# Patient Record
Sex: FEMALE | Race: BLACK OR AFRICAN AMERICAN | NOT HISPANIC OR LATINO | Employment: PART TIME | URBAN - METROPOLITAN AREA
[De-identification: names, ages, dates, MRNs, and addresses within clinical notes are randomized per-mention and may not be internally consistent; named-entity substitution may affect disease eponyms.]

---

## 2017-02-16 PROBLEM — C50.411 MALIGNANT NEOPLASM OF UPPER-OUTER QUADRANT OF RIGHT FEMALE BREAST: Status: ACTIVE | Noted: 2017-02-16

## 2017-04-25 PROBLEM — C50.411 MALIGNANT NEOPLASM OF UPPER-OUTER QUADRANT OF RIGHT FEMALE BREAST: Status: RESOLVED | Noted: 2017-02-16 | Resolved: 2017-04-25

## 2017-04-25 PROBLEM — E66.9 NON MORBID OBESITY: Status: ACTIVE | Noted: 2017-04-25

## 2017-07-17 PROBLEM — T45.1X5A NEUROPATHY DUE TO CHEMOTHERAPEUTIC DRUG: Status: ACTIVE | Noted: 2017-07-17

## 2017-07-17 PROBLEM — G57.93 NEUROPATHIC PAIN OF BOTH LEGS: Status: ACTIVE | Noted: 2017-07-17

## 2017-07-17 PROBLEM — G62.0 NEUROPATHY DUE TO CHEMOTHERAPEUTIC DRUG: Status: ACTIVE | Noted: 2017-07-17

## 2017-08-21 PROBLEM — C50.911: Status: ACTIVE | Noted: 2017-01-01

## 2018-01-01 ENCOUNTER — TELEPHONE (OUTPATIENT)
Dept: HEMATOLOGY/ONCOLOGY | Facility: CLINIC | Age: 43
End: 2018-01-01

## 2018-01-01 ENCOUNTER — INITIAL CONSULT (OUTPATIENT)
Dept: HEMATOLOGY/ONCOLOGY | Facility: CLINIC | Age: 43
End: 2018-01-01
Payer: COMMERCIAL

## 2018-01-01 ENCOUNTER — OFFICE VISIT (OUTPATIENT)
Dept: WOUND CARE | Facility: CLINIC | Age: 43
End: 2018-01-01
Payer: COMMERCIAL

## 2018-01-01 ENCOUNTER — SURGERY (OUTPATIENT)
Age: 43
End: 2018-01-01

## 2018-01-01 ENCOUNTER — TELEPHONE (OUTPATIENT)
Dept: INFUSION THERAPY | Facility: HOSPITAL | Age: 43
End: 2018-01-01

## 2018-01-01 ENCOUNTER — TELEPHONE (OUTPATIENT)
Dept: PAIN MEDICINE | Facility: CLINIC | Age: 43
End: 2018-01-01

## 2018-01-01 ENCOUNTER — HOSPITAL ENCOUNTER (OUTPATIENT)
Facility: HOSPITAL | Age: 43
LOS: 1 days | Discharge: HOME | End: 2018-06-22
Attending: SURGERY | Admitting: SURGERY
Payer: COMMERCIAL

## 2018-01-01 ENCOUNTER — TELEPHONE (OUTPATIENT)
Dept: SURGERY | Facility: CLINIC | Age: 43
End: 2018-01-01

## 2018-01-01 ENCOUNTER — OFFICE VISIT (OUTPATIENT)
Dept: HEMATOLOGY/ONCOLOGY | Facility: CLINIC | Age: 43
End: 2018-01-01
Payer: COMMERCIAL

## 2018-01-01 ENCOUNTER — OFFICE VISIT (OUTPATIENT)
Dept: PSYCHIATRY | Facility: CLINIC | Age: 43
End: 2018-01-01
Payer: COMMERCIAL

## 2018-01-01 ENCOUNTER — ANESTHESIA EVENT (OUTPATIENT)
Dept: SURGERY | Facility: HOSPITAL | Age: 43
End: 2018-01-01
Payer: COMMERCIAL

## 2018-01-01 ENCOUNTER — OFFICE VISIT (OUTPATIENT)
Dept: SURGERY | Facility: CLINIC | Age: 43
End: 2018-01-01
Payer: COMMERCIAL

## 2018-01-01 ENCOUNTER — ANESTHESIA (OUTPATIENT)
Dept: SURGERY | Facility: HOSPITAL | Age: 43
End: 2018-01-01
Payer: COMMERCIAL

## 2018-01-01 ENCOUNTER — LAB VISIT (OUTPATIENT)
Dept: LAB | Facility: HOSPITAL | Age: 43
End: 2018-01-01
Payer: COMMERCIAL

## 2018-01-01 ENCOUNTER — RESEARCH ENCOUNTER (OUTPATIENT)
Dept: RESEARCH | Facility: HOSPITAL | Age: 43
End: 2018-01-01

## 2018-01-01 ENCOUNTER — HOSPITAL ENCOUNTER (OUTPATIENT)
Dept: RADIOLOGY | Facility: HOSPITAL | Age: 43
Discharge: HOME | End: 2018-06-14
Attending: INTERNAL MEDICINE
Payer: COMMERCIAL

## 2018-01-01 VITALS
TEMPERATURE: 98 F | SYSTOLIC BLOOD PRESSURE: 110 MMHG | DIASTOLIC BLOOD PRESSURE: 73 MMHG | WEIGHT: 199 LBS | HEART RATE: 104 BPM | BODY MASS INDEX: 33.97 KG/M2 | HEIGHT: 64 IN

## 2018-01-01 VITALS
RESPIRATION RATE: 18 BRPM | TEMPERATURE: 100 F | HEART RATE: 126 BPM | HEIGHT: 64 IN | SYSTOLIC BLOOD PRESSURE: 105 MMHG | DIASTOLIC BLOOD PRESSURE: 70 MMHG | OXYGEN SATURATION: 94 %

## 2018-01-01 VITALS
TEMPERATURE: 98 F | HEART RATE: 81 BPM | DIASTOLIC BLOOD PRESSURE: 82 MMHG | HEIGHT: 64 IN | WEIGHT: 206.81 LBS | RESPIRATION RATE: 16 BRPM | BODY MASS INDEX: 35.31 KG/M2 | SYSTOLIC BLOOD PRESSURE: 132 MMHG

## 2018-01-01 VITALS
OXYGEN SATURATION: 97 % | TEMPERATURE: 98 F | BODY MASS INDEX: 33.15 KG/M2 | WEIGHT: 199 LBS | DIASTOLIC BLOOD PRESSURE: 89 MMHG | HEART RATE: 89 BPM | SYSTOLIC BLOOD PRESSURE: 138 MMHG | RESPIRATION RATE: 18 BRPM | HEIGHT: 65 IN

## 2018-01-01 VITALS
TEMPERATURE: 99 F | HEIGHT: 64 IN | SYSTOLIC BLOOD PRESSURE: 111 MMHG | DIASTOLIC BLOOD PRESSURE: 71 MMHG | HEART RATE: 117 BPM

## 2018-01-01 VITALS
DIASTOLIC BLOOD PRESSURE: 72 MMHG | WEIGHT: 209.69 LBS | SYSTOLIC BLOOD PRESSURE: 104 MMHG | BODY MASS INDEX: 35.8 KG/M2 | HEART RATE: 72 BPM | TEMPERATURE: 98 F | HEIGHT: 64 IN | RESPIRATION RATE: 18 BRPM

## 2018-01-01 VITALS
TEMPERATURE: 97 F | WEIGHT: 202.81 LBS | SYSTOLIC BLOOD PRESSURE: 112 MMHG | HEIGHT: 64 IN | HEART RATE: 98 BPM | BODY MASS INDEX: 34.62 KG/M2 | DIASTOLIC BLOOD PRESSURE: 81 MMHG

## 2018-01-01 DIAGNOSIS — R22.31 AXILLARY MASS, RIGHT: ICD-10-CM

## 2018-01-01 DIAGNOSIS — I89.0 LYMPHEDEMA OF RIGHT UPPER EXTREMITY: ICD-10-CM

## 2018-01-01 DIAGNOSIS — C50.411 MALIGNANT NEOPLASM OF UPPER-OUTER QUADRANT OF RIGHT BREAST IN FEMALE, ESTROGEN RECEPTOR NEGATIVE: ICD-10-CM

## 2018-01-01 DIAGNOSIS — C50.411 MALIGNANT NEOPLASM OF UPPER-OUTER QUADRANT OF RIGHT BREAST IN FEMALE, ESTROGEN RECEPTOR NEGATIVE: Primary | ICD-10-CM

## 2018-01-01 DIAGNOSIS — Z17.1 MALIGNANT NEOPLASM OF UPPER-OUTER QUADRANT OF RIGHT BREAST IN FEMALE, ESTROGEN RECEPTOR NEGATIVE: ICD-10-CM

## 2018-01-01 DIAGNOSIS — Z17.1 MALIGNANT NEOPLASM OF UPPER-OUTER QUADRANT OF RIGHT BREAST IN FEMALE, ESTROGEN RECEPTOR NEGATIVE: Primary | ICD-10-CM

## 2018-01-01 DIAGNOSIS — Z00.6 EXAMINATION OF PARTICIPANT IN CLINICAL TRIAL: ICD-10-CM

## 2018-01-01 DIAGNOSIS — C50.911 LOCAL RECURRENCE OF MALIGNANT TUMOR OF RIGHT BREAST: ICD-10-CM

## 2018-01-01 DIAGNOSIS — C50.411 MALIGNANT NEOPLASM OF UPPER-OUTER QUADRANT OF RIGHT FEMALE BREAST, UNSPECIFIED ESTROGEN RECEPTOR STATUS: Primary | ICD-10-CM

## 2018-01-01 DIAGNOSIS — R11.2 CHEMOTHERAPY INDUCED NAUSEA AND VOMITING: Primary | ICD-10-CM

## 2018-01-01 DIAGNOSIS — C50.011 MALIGNANT NEOPLASM OF NIPPLE OF RIGHT BREAST IN FEMALE, UNSPECIFIED ESTROGEN RECEPTOR STATUS: ICD-10-CM

## 2018-01-01 DIAGNOSIS — G89.3 CANCER RELATED PAIN: Primary | ICD-10-CM

## 2018-01-01 DIAGNOSIS — F43.23 ADJUSTMENT DISORDER WITH MIXED ANXIETY AND DEPRESSED MOOD: Primary | ICD-10-CM

## 2018-01-01 DIAGNOSIS — C50.911 BREAST CANCER METASTASIZED TO AXILLARY LYMPH NODE, RIGHT: Primary | ICD-10-CM

## 2018-01-01 DIAGNOSIS — T45.1X5A CHEMOTHERAPY INDUCED NAUSEA AND VOMITING: Primary | ICD-10-CM

## 2018-01-01 DIAGNOSIS — I82.621 ACUTE DEEP VEIN THROMBOSIS (DVT) OF RIGHT UPPER EXTREMITY, UNSPECIFIED VEIN: ICD-10-CM

## 2018-01-01 DIAGNOSIS — G89.3 CANCER ASSOCIATED PAIN: ICD-10-CM

## 2018-01-01 DIAGNOSIS — R59.0 LYMPHADENOPATHY, AXILLARY: Primary | ICD-10-CM

## 2018-01-01 DIAGNOSIS — S21.001A OPEN WOUND OF RIGHT FEMALE BREAST, INITIAL ENCOUNTER: ICD-10-CM

## 2018-01-01 DIAGNOSIS — I89.0 LYMPHEDEMA: Primary | ICD-10-CM

## 2018-01-01 DIAGNOSIS — R59.0 LYMPHADENOPATHY, AXILLARY: ICD-10-CM

## 2018-01-01 DIAGNOSIS — C77.3 BREAST CANCER METASTASIZED TO AXILLARY LYMPH NODE, RIGHT: Primary | ICD-10-CM

## 2018-01-01 DIAGNOSIS — C50.411 MALIGNANT NEOPLASM OF UPPER-OUTER QUADRANT OF RIGHT FEMALE BREAST, UNSPECIFIED ESTROGEN RECEPTOR STATUS: ICD-10-CM

## 2018-01-01 LAB
ALBUMIN SERPL BCP-MCNC: 2.7 G/DL
ALP SERPL-CCNC: 57 U/L
ALT SERPL W/O P-5'-P-CCNC: 20 U/L
ANION GAP SERPL CALC-SCNC: 9 MMOL/L
AST SERPL-CCNC: 32 U/L
BILIRUB SERPL-MCNC: 0.4 MG/DL
BUN SERPL-MCNC: 11 MG/DL
CALCIUM SERPL-MCNC: 10 MG/DL
CHLORIDE SERPL-SCNC: 105 MMOL/L
CO2 SERPL-SCNC: 25 MMOL/L
CREAT SERPL-MCNC: 0.8 MG/DL
ERYTHROCYTE [DISTWIDTH] IN BLOOD BY AUTOMATED COUNT: 13.2 %
EST. GFR  (AFRICAN AMERICAN): >60 ML/MIN/1.73 M^2
EST. GFR  (NON AFRICAN AMERICAN): >60 ML/MIN/1.73 M^2
GLUCOSE SERPL-MCNC: 90 MG/DL
HCT VFR BLD AUTO: 35.9 %
HGB BLD-MCNC: 11 G/DL
IMM GRANULOCYTES # BLD AUTO: 0.02 K/UL
MCH RBC QN AUTO: 28.4 PG
MCHC RBC AUTO-ENTMCNC: 30.6 G/DL
MCV RBC AUTO: 93 FL
NEUTROPHILS # BLD AUTO: 2.4 K/UL
PLATELET # BLD AUTO: 490 K/UL
PMV BLD AUTO: 9.1 FL
POTASSIUM SERPL-SCNC: 3.8 MMOL/L
PROT SERPL-MCNC: 7.1 G/DL
RBC # BLD AUTO: 3.87 M/UL
SODIUM SERPL-SCNC: 139 MMOL/L
WBC # BLD AUTO: 3.7 K/UL

## 2018-01-01 PROCEDURE — 63600175 PHARM REV CODE 636 W HCPCS: Performed by: ANESTHESIOLOGY

## 2018-01-01 PROCEDURE — 99999 PR PBB SHADOW E&M-EST. PATIENT-LVL II: CPT | Mod: PBBFAC,,, | Performed by: PSYCHOLOGIST

## 2018-01-01 PROCEDURE — 99244 OFF/OP CNSLTJ NEW/EST MOD 40: CPT | Mod: S$GLB,,, | Performed by: SURGERY

## 2018-01-01 PROCEDURE — 64461 PVB THORACIC SINGLE INJ SITE: CPT | Mod: 59,RT,, | Performed by: ANESTHESIOLOGY

## 2018-01-01 PROCEDURE — 99215 OFFICE O/P EST HI 40 MIN: CPT | Mod: 25,S$GLB,, | Performed by: INTERNAL MEDICINE

## 2018-01-01 PROCEDURE — 37000008 HC ANESTHESIA 1ST 15 MINUTES: Performed by: SURGERY

## 2018-01-01 PROCEDURE — 71000039 HC RECOVERY, EACH ADD'L HOUR: Performed by: SURGERY

## 2018-01-01 PROCEDURE — 11100 PR BIOPSY OF SKIN LESION: CPT | Mod: ,,, | Performed by: SURGERY

## 2018-01-01 PROCEDURE — 71000033 HC RECOVERY, INTIAL HOUR: Performed by: SURGERY

## 2018-01-01 PROCEDURE — 63600175 PHARM REV CODE 636 W HCPCS: Performed by: STUDENT IN AN ORGANIZED HEALTH CARE EDUCATION/TRAINING PROGRAM

## 2018-01-01 PROCEDURE — 37000009 HC ANESTHESIA EA ADD 15 MINS: Performed by: SURGERY

## 2018-01-01 PROCEDURE — 99024 POSTOP FOLLOW-UP VISIT: CPT | Mod: S$GLB,,, | Performed by: SURGERY

## 2018-01-01 PROCEDURE — 25000003 PHARM REV CODE 250: Performed by: STUDENT IN AN ORGANIZED HEALTH CARE EDUCATION/TRAINING PROGRAM

## 2018-01-01 PROCEDURE — 80053 COMPREHEN METABOLIC PANEL: CPT

## 2018-01-01 PROCEDURE — 25000003 PHARM REV CODE 250: Performed by: NURSE ANESTHETIST, CERTIFIED REGISTERED

## 2018-01-01 PROCEDURE — 96372 THER/PROPH/DIAG INJ SC/IM: CPT | Mod: S$GLB,,, | Performed by: INTERNAL MEDICINE

## 2018-01-01 PROCEDURE — 27000221 HC OXYGEN, UP TO 24 HOURS

## 2018-01-01 PROCEDURE — 99999 PR PBB SHADOW E&M-EST. PATIENT-LVL IV: CPT | Mod: PBBFAC,,, | Performed by: NURSE PRACTITIONER

## 2018-01-01 PROCEDURE — D9220A PRA ANESTHESIA: Mod: CRNA,,, | Performed by: NURSE ANESTHETIST, CERTIFIED REGISTERED

## 2018-01-01 PROCEDURE — 63600175 PHARM REV CODE 636 W HCPCS: Performed by: NURSE ANESTHETIST, CERTIFIED REGISTERED

## 2018-01-01 PROCEDURE — 99999 PR PBB SHADOW E&M-EST. PATIENT-LVL V: CPT | Mod: PBBFAC,,, | Performed by: INTERNAL MEDICINE

## 2018-01-01 PROCEDURE — 99999 PR PBB SHADOW E&M-EST. PATIENT-LVL III: CPT | Mod: PBBFAC,,, | Performed by: INTERNAL MEDICINE

## 2018-01-01 PROCEDURE — 71000015 HC POSTOP RECOV 1ST HR: Performed by: SURGERY

## 2018-01-01 PROCEDURE — 99999 PR PBB SHADOW E&M-EST. PATIENT-LVL III: CPT | Mod: PBBFAC,,, | Performed by: SURGERY

## 2018-01-01 PROCEDURE — 3008F BODY MASS INDEX DOCD: CPT | Mod: CPTII,S$GLB,, | Performed by: NURSE PRACTITIONER

## 2018-01-01 PROCEDURE — 36000706: Performed by: SURGERY

## 2018-01-01 PROCEDURE — 90791 PSYCH DIAGNOSTIC EVALUATION: CPT | Mod: S$GLB,,, | Performed by: PSYCHOLOGIST

## 2018-01-01 PROCEDURE — 99215 OFFICE O/P EST HI 40 MIN: CPT | Mod: S$GLB,,, | Performed by: INTERNAL MEDICINE

## 2018-01-01 PROCEDURE — 64520 N BLOCK LUMBAR/THORACIC: CPT | Performed by: ANESTHESIOLOGY

## 2018-01-01 PROCEDURE — 3008F BODY MASS INDEX DOCD: CPT | Mod: CPTII,S$GLB,, | Performed by: INTERNAL MEDICINE

## 2018-01-01 PROCEDURE — S0028 INJECTION, FAMOTIDINE, 20 MG: HCPCS | Performed by: NURSE ANESTHETIST, CERTIFIED REGISTERED

## 2018-01-01 PROCEDURE — 85027 COMPLETE CBC AUTOMATED: CPT

## 2018-01-01 PROCEDURE — 99205 OFFICE O/P NEW HI 60 MIN: CPT | Mod: S$GLB,,, | Performed by: INTERNAL MEDICINE

## 2018-01-01 PROCEDURE — 36000707: Performed by: SURGERY

## 2018-01-01 PROCEDURE — 99203 OFFICE O/P NEW LOW 30 MIN: CPT | Mod: S$GLB,,, | Performed by: NURSE PRACTITIONER

## 2018-01-01 PROCEDURE — D9220A PRA ANESTHESIA: Mod: ANES,,, | Performed by: ANESTHESIOLOGY

## 2018-01-01 PROCEDURE — 36415 COLL VENOUS BLD VENIPUNCTURE: CPT

## 2018-01-01 PROCEDURE — 63600175 PHARM REV CODE 636 W HCPCS

## 2018-01-01 PROCEDURE — 90832 PSYTX W PT 30 MINUTES: CPT | Mod: S$GLB,,, | Performed by: PSYCHOLOGIST

## 2018-01-01 PROCEDURE — 27201423 OPTIME MED/SURG SUP & DEVICES STERILE SUPPLY: Performed by: SURGERY

## 2018-01-01 RX ORDER — PROPOFOL 10 MG/ML
VIAL (ML) INTRAVENOUS
Status: DISCONTINUED | OUTPATIENT
Start: 2018-01-01 | End: 2018-01-01

## 2018-01-01 RX ORDER — HYDROMORPHONE HYDROCHLORIDE 2 MG/ML
1 INJECTION, SOLUTION INTRAMUSCULAR; INTRAVENOUS; SUBCUTANEOUS ONCE
Status: DISCONTINUED | OUTPATIENT
Start: 2018-01-01 | End: 2018-01-01

## 2018-01-01 RX ORDER — HYDROMORPHONE HYDROCHLORIDE 2 MG/ML
1 INJECTION, SOLUTION INTRAMUSCULAR; INTRAVENOUS; SUBCUTANEOUS ONCE
Status: COMPLETED | OUTPATIENT
Start: 2018-01-01 | End: 2018-01-01

## 2018-01-01 RX ORDER — MIDAZOLAM HYDROCHLORIDE 1 MG/ML
2 INJECTION INTRAMUSCULAR; INTRAVENOUS ONCE
Status: COMPLETED | OUTPATIENT
Start: 2018-01-01 | End: 2018-01-01

## 2018-01-01 RX ORDER — MIDAZOLAM HYDROCHLORIDE 1 MG/ML
INJECTION, SOLUTION INTRAMUSCULAR; INTRAVENOUS
Status: DISCONTINUED | OUTPATIENT
Start: 2018-01-01 | End: 2018-01-01

## 2018-01-01 RX ORDER — HYDROMORPHONE HYDROCHLORIDE 1 MG/ML
0.2 INJECTION, SOLUTION INTRAMUSCULAR; INTRAVENOUS; SUBCUTANEOUS EVERY 5 MIN PRN
Status: COMPLETED | OUTPATIENT
Start: 2018-01-01 | End: 2018-01-01

## 2018-01-01 RX ORDER — SUCCINYLCHOLINE CHLORIDE 20 MG/ML
INJECTION INTRAMUSCULAR; INTRAVENOUS
Status: DISCONTINUED | OUTPATIENT
Start: 2018-01-01 | End: 2018-01-01

## 2018-01-01 RX ORDER — FAMOTIDINE 10 MG/ML
INJECTION INTRAVENOUS
Status: DISCONTINUED | OUTPATIENT
Start: 2018-01-01 | End: 2018-01-01

## 2018-01-01 RX ORDER — LIDOCAINE HCL/PF 100 MG/5ML
SYRINGE (ML) INTRAVENOUS
Status: DISCONTINUED | OUTPATIENT
Start: 2018-01-01 | End: 2018-01-01

## 2018-01-01 RX ORDER — OXYCODONE HYDROCHLORIDE 10 MG/1
10 TABLET ORAL
Qty: 180 TABLET | Refills: 0 | Status: SHIPPED | OUTPATIENT
Start: 2018-01-01 | End: 2018-01-01 | Stop reason: SDUPTHER

## 2018-01-01 RX ORDER — DEXAMETHASONE SODIUM PHOSPHATE 4 MG/ML
INJECTION, SOLUTION INTRA-ARTICULAR; INTRALESIONAL; INTRAMUSCULAR; INTRAVENOUS; SOFT TISSUE
Status: DISCONTINUED | OUTPATIENT
Start: 2018-01-01 | End: 2018-01-01

## 2018-01-01 RX ORDER — KETAMINE HYDROCHLORIDE 10 MG/ML
INJECTION, SOLUTION INTRAMUSCULAR; INTRAVENOUS
Status: DISCONTINUED | OUTPATIENT
Start: 2018-01-01 | End: 2018-01-01

## 2018-01-01 RX ORDER — ACETAMINOPHEN 10 MG/ML
INJECTION, SOLUTION INTRAVENOUS
Status: DISCONTINUED | OUTPATIENT
Start: 2018-01-01 | End: 2018-01-01

## 2018-01-01 RX ORDER — FENTANYL 75 UG/H
1 PATCH TRANSDERMAL
Qty: 10 PATCH | Refills: 0 | Status: SHIPPED | OUTPATIENT
Start: 2018-01-01 | End: 2018-01-01 | Stop reason: ALTCHOICE

## 2018-01-01 RX ORDER — ROCURONIUM BROMIDE 10 MG/ML
INJECTION, SOLUTION INTRAVENOUS
Status: DISCONTINUED | OUTPATIENT
Start: 2018-01-01 | End: 2018-01-01

## 2018-01-01 RX ORDER — SODIUM CHLORIDE 0.9 % (FLUSH) 0.9 %
3 SYRINGE (ML) INJECTION
Status: DISCONTINUED | OUTPATIENT
Start: 2018-01-01 | End: 2018-01-01 | Stop reason: HOSPADM

## 2018-01-01 RX ORDER — CEFAZOLIN SODIUM 1 G/3ML
2 INJECTION, POWDER, FOR SOLUTION INTRAMUSCULAR; INTRAVENOUS
Status: COMPLETED | OUTPATIENT
Start: 2018-01-01 | End: 2018-01-01

## 2018-01-01 RX ORDER — PROMETHAZINE HYDROCHLORIDE 25 MG/1
25 TABLET ORAL EVERY 6 HOURS PRN
Qty: 60 TABLET | Refills: 7 | Status: ON HOLD | OUTPATIENT
Start: 2018-01-01 | End: 2018-01-01

## 2018-01-01 RX ORDER — MEPERIDINE HYDROCHLORIDE 50 MG/ML
12.5 INJECTION INTRAMUSCULAR; INTRAVENOUS; SUBCUTANEOUS ONCE AS NEEDED
Status: DISCONTINUED | OUTPATIENT
Start: 2018-01-01 | End: 2018-01-01 | Stop reason: HOSPADM

## 2018-01-01 RX ORDER — FENTANYL CITRATE 50 UG/ML
INJECTION, SOLUTION INTRAMUSCULAR; INTRAVENOUS
Status: DISCONTINUED | OUTPATIENT
Start: 2018-01-01 | End: 2018-01-01

## 2018-01-01 RX ORDER — ONDANSETRON 8 MG/1
8 TABLET, ORALLY DISINTEGRATING ORAL EVERY 12 HOURS PRN
Qty: 30 TABLET | Refills: 1 | Status: SHIPPED | OUTPATIENT
Start: 2018-01-01 | End: 2018-01-01

## 2018-01-01 RX ORDER — FENTANYL CITRATE 50 UG/ML
200 INJECTION, SOLUTION INTRAMUSCULAR; INTRAVENOUS ONCE
Status: COMPLETED | OUTPATIENT
Start: 2018-01-01 | End: 2018-01-01

## 2018-01-01 RX ORDER — HYDROMORPHONE HYDROCHLORIDE 1 MG/ML
INJECTION, SOLUTION INTRAMUSCULAR; INTRAVENOUS; SUBCUTANEOUS
Status: COMPLETED
Start: 2018-01-01 | End: 2018-01-01

## 2018-01-01 RX ORDER — OXYCODONE HYDROCHLORIDE 5 MG/1
15 TABLET ORAL EVERY 4 HOURS PRN
Status: DISCONTINUED | OUTPATIENT
Start: 2018-01-01 | End: 2018-01-01 | Stop reason: HOSPADM

## 2018-01-01 RX ORDER — SODIUM CHLORIDE 9 MG/ML
INJECTION, SOLUTION INTRAVENOUS CONTINUOUS
Status: DISCONTINUED | OUTPATIENT
Start: 2018-01-01 | End: 2018-01-01 | Stop reason: HOSPADM

## 2018-01-01 RX ORDER — ONDANSETRON 2 MG/ML
INJECTION INTRAMUSCULAR; INTRAVENOUS
Status: DISCONTINUED | OUTPATIENT
Start: 2018-01-01 | End: 2018-01-01

## 2018-01-01 RX ADMIN — Medication 0.2 MG: at 08:06

## 2018-01-01 RX ADMIN — Medication 0.2 MG: at 09:06

## 2018-01-01 RX ADMIN — FAMOTIDINE 20 MG: 10 INJECTION, SOLUTION INTRAVENOUS at 07:06

## 2018-01-01 RX ADMIN — MIDAZOLAM HYDROCHLORIDE 2 MG: 1 INJECTION, SOLUTION INTRAMUSCULAR; INTRAVENOUS at 06:06

## 2018-01-01 RX ADMIN — SODIUM CHLORIDE: 0.9 INJECTION, SOLUTION INTRAVENOUS at 07:06

## 2018-01-01 RX ADMIN — KETAMINE HYDROCHLORIDE 30 MG: 10 INJECTION, SOLUTION INTRAMUSCULAR; INTRAVENOUS at 07:06

## 2018-01-01 RX ADMIN — FENTANYL CITRATE 100 MCG: 50 INJECTION, SOLUTION INTRAMUSCULAR; INTRAVENOUS at 06:06

## 2018-01-01 RX ADMIN — PROPOFOL 200 MG: 10 INJECTION, EMULSION INTRAVENOUS at 07:06

## 2018-01-01 RX ADMIN — OXYCODONE HYDROCHLORIDE 15 MG: 5 TABLET ORAL at 08:06

## 2018-01-01 RX ADMIN — ACETAMINOPHEN 1000 MG: 10 INJECTION, SOLUTION INTRAVENOUS at 07:06

## 2018-01-01 RX ADMIN — ROCURONIUM BROMIDE 5 MG: 10 INJECTION, SOLUTION INTRAVENOUS at 07:06

## 2018-01-01 RX ADMIN — DEXAMETHASONE SODIUM PHOSPHATE 4 MG: 4 INJECTION, SOLUTION INTRAMUSCULAR; INTRAVENOUS at 07:06

## 2018-01-01 RX ADMIN — MIDAZOLAM HYDROCHLORIDE 2 MG: 1 INJECTION, SOLUTION INTRAMUSCULAR; INTRAVENOUS at 07:06

## 2018-01-01 RX ADMIN — CEFAZOLIN 2 G: 330 INJECTION, POWDER, FOR SOLUTION INTRAMUSCULAR; INTRAVENOUS at 07:06

## 2018-01-01 RX ADMIN — ONDANSETRON 4 MG: 2 INJECTION INTRAMUSCULAR; INTRAVENOUS at 07:06

## 2018-01-01 RX ADMIN — HYDROMORPHONE HYDROCHLORIDE 1 MG: 2 INJECTION, SOLUTION INTRAMUSCULAR; INTRAVENOUS; SUBCUTANEOUS at 10:07

## 2018-01-01 RX ADMIN — SUCCINYLCHOLINE CHLORIDE 120 MG: 20 INJECTION, SOLUTION INTRAMUSCULAR; INTRAVENOUS at 07:06

## 2018-01-01 RX ADMIN — FENTANYL CITRATE 50 MCG: 50 INJECTION, SOLUTION INTRAMUSCULAR; INTRAVENOUS at 07:06

## 2018-01-01 RX ADMIN — LIDOCAINE HYDROCHLORIDE 100 MG: 20 INJECTION, SOLUTION INTRAVENOUS at 07:06

## 2018-03-14 PROBLEM — C50.911 BREAST CANCER, RIGHT: Status: ACTIVE | Noted: 2018-01-01

## 2018-04-03 PROBLEM — N63.10 MASS OF BREAST, RIGHT: Status: ACTIVE | Noted: 2018-01-01

## 2018-04-09 PROBLEM — C50.911 BREAST CANCER, RIGHT: Status: RESOLVED | Noted: 2018-01-01 | Resolved: 2018-01-01

## 2018-04-09 PROBLEM — N63.10 MASS OF BREAST, RIGHT: Status: RESOLVED | Noted: 2018-01-01 | Resolved: 2018-01-01

## 2018-04-16 PROBLEM — R11.2 INTRACTABLE NAUSEA AND VOMITING: Status: ACTIVE | Noted: 2018-01-01

## 2018-04-17 PROBLEM — R11.2 INTRACTABLE NAUSEA AND VOMITING: Status: RESOLVED | Noted: 2018-01-01 | Resolved: 2018-01-01

## 2018-04-17 PROBLEM — G89.3 CANCER ASSOCIATED PAIN: Status: ACTIVE | Noted: 2018-01-01

## 2018-04-17 PROBLEM — R11.2 INTRACTABLE NAUSEA AND VOMITING: Status: ACTIVE | Noted: 2018-01-01

## 2018-05-10 PROBLEM — I82.621 ACUTE DEEP VEIN THROMBOSIS (DVT) OF RIGHT UPPER EXTREMITY: Status: ACTIVE | Noted: 2018-01-01

## 2018-05-17 NOTE — PROGRESS NOTES
Subjective:       Patient ID: Francisca Longoria is a 43 y.o. female.    Chief Complaint:  Breast Cancer    HPI - From Chart and Patient    43 year old female, refrred by Dr. Flores, to clinic today for evaluation of phase I clinical trials. She recently went to [facility] for second opinion.     Pertinent social history - single mother with 4 children and additional grandkids, one child has been in custodial recently, one daughter has children that patient cares for and is often responsible for, one child recently graduated college after playing college football. Her mother lives nearby and she has a boyfriend that is very supportive. She is a manager at a fast food restaurant. No tobacco, alcohol, drugs.      Oncologic History (From Chart):  DIAGNOSIS:   Stage IIIC (cT2 cN3c cM0) IDC of right breast. Triple negative. BRCA neg. Dx 6/2016   Recurrence R axilla 4/2018     HISTORY OF PRESENT ILNESS:   41yo female here for diagnosis breast cancer 6/24/16, initial consult 7/19/16.   6/13/16 - MMG 3.8cm mass in middle of R breast. U/S Irreg 3.3cm mass. Numerous R axilla LN, largest 1.9cm.   6/22/16 - right breast core biopsy - IDC, grade 3.ER 0%, HI 0%, HER2-aries neg.  7/13/16 - U/S guided core bx axilla -   7/19/16 - initial med onc consult  7/25/16 shows 3.8cm mass in the breast, 2cm LN in the axilla, and 1.3cm ipsilateral supraclav LN FDG-avid.  8/2016 - chemotherapy -  completed AC with minimal clinical response   9/14/16 - U/S - MASS UNCHANGED, no evidence of response, no progression. LAD slight smaller per U/S.   9/2016 - chemo - started taxol with carboplatin added. Completed 11th weekly taxol, 12th held due to persistent SE.   1/2017 - Mastectomy and axillary dissection. ypT2 ypN1a with residual 3 LN positive for macromets and clinical N3   supraclav node at time of diagnosis, this node not palpated at surgery.  3/2017 - 5/2017 - Adjuvant radiation completed with multiple breaks   8/2017: PETCT shows evidence  "of recurrence in right axilla.  Multiple hypermetabolic right axillary lymph nodes including a suspected right prepectoral lymph node, consistent with metastatic disease.  9/20/17 - U/S guided excisional biopsy, LN shows fat necrosis and no residual tumor.   12/12/17 - She has had reconstruction and has pain at right breast and swelling. She otherwise had no other complaints.   2/27/18 - CTA chest during ED visit - irreg 4cm mass-like area of mixed density suspicious for LAD in R axilla.   3/5/18 - CT head without con - POOJA.   3/6/2018 - patient presents after multiple ED visits for pain to right axilla, she has not been able to schedule follow-up with her surgeon to assess, been placed on abx with no improvement and pain meds with minimal control. No other new symptoms.  Still working, has to for insurance and financial support, pain extremely limiting and severe. She can move arm but painful. No numbness.      3/12/2018- US soft tissue axilla- Irregular heterogeneous hypoechoic masslike region within the right axilla deep to the patient's operative scar.   3/13/2018- she has seen surgeon for second opinion and he recommended return to her primary surgeon who can hopefully see her soon. She continues with severe pain and she feels "heat" to the area of axilla, now with dec ROM. Pain wakes her at night. Not using right arm as much. No fever.      We did order PET/CT 3/2018 but she cannot afford this.   4/3/18 - simple excision right axillary mass - 2cm mass shows grade 3 IDC of breast, large central area of necrosis. Tumor present at margin. ER is negative, LA is negative, HER2 is zero, negative, by IHC.     4/9/18 med onc follow-up - still with pain but mild better. Tearful and upset during visit today. Social issues and new diagnosis of recurrence discussed today. She is taking pain meds with some relief. No new symptoms otherwise.   4/13/18 - CT brain, chest, ab, pelvis - POOJA except known axillary abnormalities. "   4/20/18 - surgery eval - not resectable  4/26/18  - discussed if not resectable, cannot treat with intention to cure. She is very upset about this.     Review of Systems    Objective:      Physical Exam      LABS:  WBC   Date Value Ref Range Status   04/17/2018 4.30 3.90 - 12.70 K/uL Final     Hemoglobin   Date Value Ref Range Status   04/17/2018 11.4 (L) 12.0 - 16.0 g/dL Final     Hematocrit   Date Value Ref Range Status   04/17/2018 35.0 (L) 37.0 - 48.5 % Final     Platelets   Date Value Ref Range Status   04/17/2018 280 150 - 350 K/uL Final       Chemistry        Component Value Date/Time     04/17/2018 0433    K 3.6 04/17/2018 0433     04/17/2018 0433    CO2 25 04/17/2018 0433    BUN 10 04/17/2018 0433    CREATININE 0.90 04/17/2018 0433    GLU 93 04/17/2018 0433    GLU 97 12/28/2016 1003        Component Value Date/Time    CALCIUM 9.0 04/17/2018 0433    ALKPHOS 59 04/17/2018 0433    AST 21 04/17/2018 0433    ALT 26 04/17/2018 0433    BILITOT 0.5 04/17/2018 0433    ESTGFRAFRICA >60 04/17/2018 0433    EGFRNONAA >60 04/17/2018 0433          Assessment:       1. Malignant neoplasm of upper-outer quadrant of right breast in female, estrogen receptor negative    2. Local recurrence of malignant tumor of right breast        Plan:         1.  Metastatic breast cancer:  - long discussion with the patient her family about current options here for clinical trials.  She has recently been seen by [facility].  She is planning to go back there this weekend, and see what treatment options they offer her, before making a final decision.  We discussed our Calithera breast cancer trial specifically for triple negative breast cancer, and the patient met with the research nurse to discuss this as well. We also discussed next generation sequencing through the strata trial and possible tumor sensitivity testing via the canscript trial.  She wants to think about her options.    2.  Pain:  - the  patient was in significant pain due to swelling in her right upper extremity secondary to a newly diagnosed DVT.  I offered to switch her pain medicines, and give her some subcutaneous Dilaudid in the clinic, however she said that she signed a pain contract with [facility] which would not allow her to take pain medicine from any other institution.    3. DVT:  - continue current anticoagulation.    Return to clinic in 1-2 weeks, see me for follow-up and finalize treatment planning, based on decision to enroll on trial versus received treatment at [facility].    The patient agrees with the plan, and all questions have been answered to their satisfaction.      More than 60 mins were spent during this encounter, greater than 50% was spent in direct counseling and/or coordination of care.     Colton Rivers M.D., M.S., F.A.C.P.  Hematology and Oncology Attending  [facility]  [facility]

## 2018-05-17 NOTE — LETTER
May 17, 2018      Milka Flores MD  [address]   [address]  [facility]  [place] [address]           [place] [facility] H[facility]ology [facility]  [address]  [place] [address]  Phone: [phone]          Patient: Francisca Longoria   MR Number: [id number]   YOB: 1975   Date of Visit: 5/17/2018       Dear Dr. Milka Flores:    Thank you for referring Francisca Longoria to me for evaluation. Attached you will find relevant portions of my assessment and plan of care.    If you have questions, please do not hesitate to call me. I look forward to following Francisca Longoria along with you.    Sincerely,    Colton Rivers MD    Enclosure  CC:  No Recipients    If you would like to receive this communication electronically, please contact [email or web address] or [phone] to request more information on [employer] access.    For providers and/or their staff who would like to refer a patient to [facility], please contact us through our one-stop-shop provider referral line, [facility], at [phone].    If you feel you have received this communication in error or would no longer like to receive these types of communications, please e-mail [email or web address]

## 2018-05-25 PROBLEM — F41.9 ANXIETY: Status: ACTIVE | Noted: 2018-01-01

## 2018-05-29 PROBLEM — L03.113 CELLULITIS OF RIGHT UPPER EXTREMITY: Status: ACTIVE | Noted: 2018-01-01

## 2018-05-30 PROBLEM — K59.00 CONSTIPATION: Status: ACTIVE | Noted: 2018-01-01

## 2018-05-30 PROBLEM — R07.9 CHEST PAIN: Status: ACTIVE | Noted: 2018-01-01

## 2018-05-31 NOTE — TELEPHONE ENCOUNTER
----- Message from Josy Peter sent at 5/31/2018 11:34 AM CDT -----  Regarding: Reschedule appt  Patient is currently admitted into hospital. Please give patient call at number in chart to reschedule appointment currently scheduled for tomorrow.    Thank you,  Josy

## 2018-05-31 NOTE — TELEPHONE ENCOUNTER
Attempted to call patient to reschedule her appointment. Left office number on voicemail to call to reschedule

## 2018-06-01 NOTE — TELEPHONE ENCOUNTER
----- Message from Sumanth Pickard RN sent at 6/1/2018  3:39 PM CDT -----  Contact: Pt   Do her in a Phase 1 8am slot with Dr. Rivers on Tuesday  ----- Message -----  From: Guerda Graham  Sent: 6/1/2018   3:37 PM  To: Sumanth Pickard RN        ----- Message -----  From: Guerda Graham  Sent: 6/1/2018   3:37 PM  To: Guerda Graham    Can she see Cary or does she have to see Dr. Rivers? I was not sure what her f/u was for  ----- Message -----  From: Guerda Graham  Sent: 6/1/2018  11:25 AM  To: Guerda Graham        ----- Message -----  From: Scott Rose  Sent: 6/1/2018  11:23 AM  To: Case Rose    Will like to reschedule today's 6.1.18 lab and appt with dr rivers     Contact::[phone]

## 2018-06-01 NOTE — TELEPHONE ENCOUNTER
Called and spoke with patient and assisted with rescheduling her apts to Tuesday 7am labs and 8am with Dr. Rivers. Patient agreed to apts.

## 2018-06-04 NOTE — PROGRESS NOTES
Subjective:       Patient ID: Francisca Longoria is a 43 y.o. female.    Chief Complaint:  Breast Cancer    HPI     43 year old female, refrred by Dr. Flores, to clinic today for follow-up evaluation of clinical trials. She recently went to [facility] for second opinion, and was treated there with one dose of IV chemo (unknown type) and four days of Xeloda.  She has been off therapy completely for a few weeks.  Today, she notes 10/10 pain in the R axilla and arm related to her metastases there.      Pertinent social history - single mother with 4 children and additional grandkids, one child has been in skilled nursing recently, one daughter has children that patient cares for and is often responsible for, one child recently graduated college after playing college football. Her mother lives nearby and she has a boyfriend that is very supportive. 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      Oncologic History (From Chart):  DIAGNOSIS:   Stage IIIC (cT2 cN3c cM0) IDC of right breast. Triple negative. BRCA neg. Dx 6/2016   Recurrence R axilla 4/2018     HISTORY OF PRESENT ILNESS:   43yo female here for diagnosis breast cancer 6/24/16, initial consult 7/19/16.   6/13/16 - MMG 3.8cm mass in middle of R breast. U/S Irreg 3.3cm mass. Numerous R axilla LN, largest 1.9cm.   6/22/16 - right breast core biopsy - IDC, grade 3.ER 0%, MT 0%, HER2-aries neg.  7/13/16 - U/S guided core bx axilla -   7/19/16 - initial med onc consult  7/25/16 shows 3.8cm mass in the breast, 2cm LN in the axilla, and 1.3cm ipsilateral supraclav LN FDG-avid.  8/2016 - chemotherapy -  completed AC with minimal clinical response   9/14/16 - U/S - MASS UNCHANGED, no evidence of response, no progression. LAD slight smaller per U/S.   9/2016 - chemo - started taxol with carboplatin added. Completed 11th weekly taxol, 12th held due to persistent SE.   1/2017 - Mastectomy and axillary dissection. ypT2 ypN1a with residual 3 LN  "positive for macromets and clinical N3   supraclav node at time of diagnosis, this node not palpated at surgery.  3/2017 - 5/2017 - Adjuvant radiation completed with multiple breaks   8/2017: PETCT shows evidence of recurrence in right axilla.  Multiple hypermetabolic right axillary lymph nodes including a suspected right prepectoral lymph node, consistent with metastatic disease.  9/20/17 - U/S guided excisional biopsy, LN shows fat necrosis and no residual tumor.   12/12/17 - She has had reconstruction and has pain at right breast and swelling. She otherwise had no other complaints.   2/27/18 - CTA chest during ED visit - irreg 4cm mass-like area of mixed density suspicious for LAD in R axilla.   3/5/18 - CT head without con - POOJA.   3/6/2018 - patient presents after multiple ED visits for pain to right axilla, she has not been able to schedule follow-up with her surgeon to assess, been placed on abx with no improvement and pain meds with minimal control. No other new symptoms.  Still working, has to for insurance and financial support, pain extremely limiting and severe. She can move arm but painful. No numbness.      3/12/2018- US soft tissue axilla- Irregular heterogeneous hypoechoic masslike region within the right axilla deep to the patient's operative scar.   3/13/2018- she has seen surgeon for second opinion and he recommended return to her primary surgeon who can hopefully see her soon. She continues with severe pain and she feels "heat" to the area of axilla, now with dec ROM. Pain wakes her at night. Not using right arm as much. No fever.      We did order PET/CT 3/2018 but she cannot afford this.   4/3/18 - simple excision right axillary mass - 2cm mass shows grade 3 IDC of breast, large central area of necrosis. Tumor present at margin. ER is negative, MD is negative, HER2 is zero, negative, by IHC.     4/9/18 med onc follow-up - still with pain but mild better. Tearful and upset during visit today. " Social issues and new diagnosis of recurrence discussed today. She is taking pain meds with some relief. No new symptoms otherwise.   4/13/18 - CT brain, chest, ab, pelvis - POOJA except known axillary abnormalities.   4/20/18 - surgery eval - not resectable  4/26/18  - discussed if not resectable, cannot treat with intention to cure. She is very upset about this.     Review of Systems    Objective:      Physical Exam      LABS:  WBC   Date Value Ref Range Status   06/01/2018 3.90 3.90 - 12.70 K/uL Final     Hemoglobin   Date Value Ref Range Status   06/01/2018 10.0 (L) 12.0 - 16.0 g/dL Final     Hematocrit   Date Value Ref Range Status   06/01/2018 30.1 (L) 37.0 - 48.5 % Final     Platelets   Date Value Ref Range Status   06/01/2018 379 (H) 150 - 350 K/uL Final       Chemistry        Component Value Date/Time     06/01/2018 0619    K 4.1 06/01/2018 0619     06/01/2018 0619    CO2 27 06/01/2018 0619    BUN 9 06/01/2018 0619    CREATININE 0.80 06/01/2018 0619    GLU 92 06/01/2018 0619    GLU 97 12/28/2016 1003        Component Value Date/Time    CALCIUM 9.3 06/01/2018 0619    ALKPHOS 49 06/01/2018 0619    AST 32 06/01/2018 0619    ALT 33 06/01/2018 0619    BILITOT 0.5 06/01/2018 0619    ESTGFRAFRICA >60 06/01/2018 0619    EGFRNONAA >60 06/01/2018 0619          Assessment:       1. Malignant neoplasm of upper-outer quadrant of right breast in female, estrogen receptor negative    2. Local recurrence of malignant tumor of right breast    3. Cancer associated pain    4. Acute deep vein thrombosis (DVT) of right upper extremity, unspecified vein        Plan:         1.  Metastatic breast cancer:    Long discussion with the patient her family about current options here for clinical trials.  She has recently been seen by [facility], as noted above, and received 1 dose of IV chemotherapy there along with 4 days of oral Xeloda.  She discontinued the Xeloda after this.  She has been off  therapy for a couple weeks now.  She is no longer eligible for the Calithera breast cancer trial specifically for triple negative breast cancer due to the fact that she received therapy in [place].  We will move ahead with the move fresh biopsy of the right axilla and plan for tumor sensitivity testing via the can script trial as well as next radiation sequencing the of the strata trial.  She consented with our research nurse Sangeetha today.    2.  Pain:    - the patient was in significant pain due to swelling in her right upper extremity secondary to progressive disease, and perhaps a recently diagnosed DVT.   - will increase fentanyl to 75 mcg transdermal patch, and increased oxycodone to 10 mg p.o. q.3 hours p.r.n..  - also referred to pain management, as well as wound care and lymphedema Clinic.    3.  Psychosocial:  - refer to Heme-Onc psychology.    4- continue current anticoagulation.    Return to clinic in 1-2 weeks, see me for follow-up and finalize treatment planning, based on CanScript and Strata results.      The patient agrees with the plan, and all questions have been answered to their satisfaction.      More than 40 mins were spent during this encounter, greater than 50% was spent in direct counseling and/or coordination of care.     Colton Rivers M.D., M.S., F.A.C.P.  Hematology and Oncology Attending  [facility]

## 2018-06-05 NOTE — TELEPHONE ENCOUNTER
----- Message from Colton Rivers MD sent at 6/5/2018  9:15 AM CDT -----  Refer to wound care, lymphedema clinic, pain management at [facility], and Dr. Campos.  Pt to follow-up after for bx with CanScript trial per Research RN.

## 2018-06-05 NOTE — PROGRESS NOTES
June 5, 2017     Protocol: cANscript Clinical Outcomes in a Real-World Setting (ANCERS)-2: A Prospective, Multicenter, Observational Study Examining the Clinical Utility of cANscript in Routine Clinical Practice.  Protocol # [id number]  IRB # 2017.435.A  PI: Cotlon Rivers  Version Date: 09-Sept-2017      Informed Consent Process:     I met with the patient and her family to discuss the above mentioned protocol. She is alert and oriented x 3. Mood and affect appropriate to the situation.  Patient states that she understands that the treatment she was taking for her breast cancer has stop working and she needs to have another type chemotherapy to prevent the cancer from coming back.   Purpose of the study reviewed with the patient. Patient states understanding of this information.   Length of study and number of participants reviewed with the patient.  Pt understands that once she begins therapy her cancer will be monitored every 8-9 weeks to determine her response to therapy.  Study procedures discussed in detail with the patient to include: Screening visit, Biopsy and blood draw, Treatment period,  End of study visit follow-up for disease response and Long-term follow-up.  Patient verbalized understanding of this information.  Patient responsibilities gone over in detail with patient  Pt also informed about what will happened to her test samples.  Pt voiced understanding.   All study associated Risks and/or discomforts discussed in detail with the patient and her family to include: side effects of both blood draw and biopsy. Patient verbalizes general understanding of this information.   Pt also informed while taking part in this study will be required to have imaging done every 8-9 weeks.  Benefits, costs and/or payment reimbursement and source of funding all reviewed with the patient andher . Patient states she understands that her insurance will cover cost of all standard of care medications and  procedures,the study will pay for the Canscript testing.  Patient verbalized understanding of this information.   Alternative treatment methods discussed with patient and her family; She states understanding of this information.   Study related questions/compensation for injury, and whom to contact regarding rights as a research subject all reviewed with the patient; she verbalizes understanding of this information.   Voluntary participation and withdrawal from research stressed to patient; she states she understands that she may withdraw consent at any time without compromise to her care.   Confidentiality and HIPAA discussed with patient; process of protection and security of database explained to patient; she verbalizes understanding of this information. Informed the patient that detailed information on this trial can be found at www.clinicaltrials.gov.  Additional studies section including optional biobanking discussed with patient.  Pt has chosen to participate and has initialed yes on the consent form to allow her samples for future research use.        Throughout the consenting process, the patient was given several opportunities to ask questions; all questions addressed and answered to patient and her families satisfaction per myself and Dr. Rivers. Patient states her willingness to participate in the study; patient signed and dated the consent form; copy of the consent form given to patient along with my contact information. Informed the patient that I would contact her with her next scheduled appointments once scheduled. Patient verbalized understanding. Instructed patient to notify myself and/or Dr. Rivers for any questions and/or concerns. Patient verbalized understanding.

## 2018-06-05 NOTE — TELEPHONE ENCOUNTER
spoke with pt on today in regards to all follow up appointments, pt will contact physical therapy to schedule, wound care is pending at the moment, pt is aware.

## 2018-06-05 NOTE — Clinical Note
Refer to wound care, lymphedema clinic, pain management at [facility], and Dr. Campos.  Pt to follow-up after for bx with CanScript trial per Research RN.

## 2018-06-06 NOTE — TELEPHONE ENCOUNTER
Called patient to discuss need for axillary mass biopsy and consult with Dr. Frank. Patient would like to coordinate these two appts on the same day. Told her I would get with our biopsy  and call her back with a date/time. Patient verbalized understanding of all information

## 2018-06-06 NOTE — TELEPHONE ENCOUNTER
----- Message from Vanda Glaser, RN sent at 6/6/2018  3:52 PM CDT -----  perri   This is the pt we were trying to schedule for Tuesday or thursday  ----- Message -----  From: Colton Rivers MD  Sent: 6/6/2018   1:58 PM  To: Marco A Frank MD, Cary Diaz NP, #    Excellent. Thank you!    ----- Message -----  From: Marco A Frank MD  Sent: 6/5/2018   6:09 PM  To: Colton Rivers MD, Vanda Glaser, RN    I can get her into clinic next Tuesday.  I am out the tail end of this week.  Will forward to my nurse, Vanda.  Thanks, PERRI Kenny  ----- Message -----  From: Colton Rivers MD  Sent: 6/5/2018   9:28 AM  To: Marco A Frank MD, Sangeetha Strickland,    MsLea Longoria is a very unfortunate young patient with recurrent triple negative breast cancer, with a large mass of the right axilla.  We would like to get as much tissue is possible from this recurrence for a couple of trials, including the strata trial for next generation sequencing, and the can script trial for tumor sensitivity testing.  Would you be able to get her in for a right axillary biopsy the for sometime soon?  I would really appreciate it.  Thank you very much.    Colton

## 2018-06-06 NOTE — TELEPHONE ENCOUNTER
----- Message from Sumanth Pickard RN sent at 6/6/2018  8:39 AM CDT -----  I have talked to Tansy about the breast biopsy  ----- Message -----  From: Colton Rivers MD  Sent: 6/5/2018   9:15 AM  To: Sumanth Pickard RN    Refer to wound care, lymphedema clinic, pain management at [facility], and Dr. Campos.  Pt to follow-up after for bx with CanScript trial per Research RN.

## 2018-06-07 NOTE — TELEPHONE ENCOUNTER
Called patient to go over appointments for 6/14. Patient is to arrive for 9am to have consult with Dr. Frank. She [place] then [place] her biopsies for the study. Explained that she can drive herself, but is welcome to bring anyone with her that she would like. No need to fast.     Asked patient about blood thinners, Vit E, Fish Oil, ASA products. She takes Eloquis for blood clot, started May 8th. She states this is monitored by oncology, originally prescribed by [facility]. Told patient we will need her to hold this for three days, so not to take it on Tuesday, Wednesday, or Thursday. Explained I will contact Dr. Rivers and Dr. Flores to confirm that it is acceptable to hold it for that long.     Patient requests that appointments be mailed to her, since she cannot write them down at this time. Placed in mail today. Encouraged her to call me back if she has not gotten them by Monday/Tuesday next week.

## 2018-06-08 PROBLEM — L03.113 CELLULITIS OF RIGHT UPPER EXTREMITY: Status: RESOLVED | Noted: 2018-01-01 | Resolved: 2018-01-01

## 2018-06-08 PROBLEM — R11.2 INTRACTABLE NAUSEA AND VOMITING: Status: RESOLVED | Noted: 2018-01-01 | Resolved: 2018-01-01

## 2018-06-08 PROBLEM — S21.001A: Status: ACTIVE | Noted: 2018-01-01

## 2018-06-08 NOTE — PROGRESS NOTES
Subjective:       Patient ID: Francisca Longoria is a 43 y.o. female.    Chief Complaint: Wound Check    HPI   This is a 43 year old female referred by Dr. Rivers for evaluation and management of a wound to the right axilla. She hasStage IIIC (cT2 cN3c cM0) IDC of right breast. Triple negative. BRCA neg. Dx 6/2016   Recurrence R axilla 4/2018.  She has a DVT of the right arm.  She had a biopsy performed on 4/3/18 and the axillary wound has not healed since that time.  She has been cleaning it with wound cleanser and covering with a dry dressing.  It is not healing.  She is afebrile.  She denies increased redness or purulent drainage but does have increased edema.  Her pain level is 10/10.  She is very tearful and complaining of severe pain in the arm.  She has a fentanyl patch on and is taking roxicodone for the pain.  Her pain medicines are managed by oncology.  Review of Systems   Constitutional: Positive for appetite change and fatigue. Negative for chills, diaphoresis and fever.   HENT: Negative for hearing loss, postnasal drip, rhinorrhea, sinus pressure, sneezing, sore throat, tinnitus and trouble swallowing.    Eyes: Positive for visual disturbance.   Respiratory: Positive for shortness of breath. Negative for apnea, cough and wheezing.    Cardiovascular: Positive for chest pain. Negative for palpitations and leg swelling.   Gastrointestinal: Positive for constipation, diarrhea and nausea. Negative for vomiting.   Genitourinary: Negative for difficulty urinating, dysuria, frequency and hematuria.   Musculoskeletal: Positive for back pain. Negative for arthralgias and joint swelling.   Skin: Negative for wound.   Neurological: Positive for headaches. Negative for dizziness, weakness and light-headedness.   Hematological: Bruises/bleeds easily.   Psychiatric/Behavioral: Positive for dysphoric mood and sleep disturbance. Negative for confusion and decreased concentration. The patient is nervous/anxious.         Objective:      Physical Exam   Constitutional: She is oriented to person, place, and time. She appears well-developed and well-nourished. No distress.   HENT:   Head: Normocephalic and atraumatic.   Mouth/Throat: Oropharynx is clear and moist.   Eyes: Conjunctivae and EOM are normal. Pupils are equal, round, and reactive to light. Right eye exhibits no discharge. Left eye exhibits no discharge. No scleral icterus.   Neck: Normal range of motion. Neck supple. No JVD present. No tracheal deviation present. No thyromegaly present.   Cardiovascular: Normal rate, regular rhythm and normal heart sounds.  Exam reveals no gallop and no friction rub.    No murmur heard.  Pulmonary/Chest: Effort normal and breath sounds normal. No respiratory distress. She has no wheezes. She has no rales.   Abdominal: Soft. Bowel sounds are normal. She exhibits no distension. There is no tenderness.   Musculoskeletal: Normal range of motion. She exhibits edema (right arm and breast). She exhibits no tenderness.        Arms:  Neurological: She is alert and oriented to person, place, and time.   Skin: Skin is warm and dry. No rash noted. She is not diaphoretic. No erythema.   Psychiatric: She has a normal mood and affect. Her behavior is normal. Judgment and thought content normal.   Nursing note and vitals reviewed.      Assessment:       1. Open wound of right female breast, initial encounter        Plan:           Apply medihoney gel to right axillary wound, cover with gauze and secure with medipore tape.  Return to clinic in one month.

## 2018-06-08 NOTE — LETTER
June 8, 2018      Colton Rivers MD  [address] [facility] [address]           [facility]  [address]  [place] [address]  Phone: [phone]          Patient: Francisca Longoria   MR Number: [id number]   YOB: 1975   Date of Visit: 6/8/2018       Dear Dr. Colton Rivers:    Thank you for referring Francisca Longoria to me for evaluation. Attached you will find relevant portions of my assessment and plan of care.    If you have questions, please do not hesitate to call me. I look forward to following Francisca Longoria along with you.    Sincerely,    Gertrude Caba, NP    Enclosure  CC:  No Recipients    If you would like to receive this communication electronically, please contact [email or web address] or [phone] to request more information on [email or web address] Link access.    For providers and/or their staff who would like to refer a patient to [facility], please contact us through our one-stop-shop provider referral line, [facility], at [phone].    If you feel you have received this communication in error or would no longer like to receive these types of communications, please e-mail [email or web address]

## 2018-06-08 NOTE — PATIENT INSTRUCTIONS
Wound Care  Taking proper care of your wound will help it heal. Your healthcare provider may show you how to clean and dress the wound. He or she will also explain how to tell if the wound is healing normally. Here are the basic steps:      A wound that's not healing normally may be dark in color or have white streaks.    Wash Your Hands  · Use liquid soap and lather for 2 minutes. Scrub between your fingers and under your nails.  · Rinse with warm water, keeping your fingers pointing down.  · Use a paper towel to dry your hands and to turn off the faucet.  Remove the Used Dressing  · Set up your supplies.  · Put on disposable gloves if youre dressing a wound for someone else or your wound is infected.  · Gently take off the old dressing. If you have a drain or tube in the wound, be careful not to pull on it.  · Loosen the tape by pulling gently toward the wound.  · Remove the dressing one layer at a time and put it in a plastic bag.  · Remove your gloves.  Inspect and Dress the Wound  · Each time you change the dressing, inspect the wound carefully to be sure its healing normally.  · Wash your hands again. Put on a new pair of gloves if youre dressing a wound for someone else or if your wound is infected.  · Clean and dress the wound as directed by your doctor or nurse. If you have a drain or tube, be careful not to pull on it.  · Put all supplies in a plastic bag; seal the bag and put it in the trash.  · Be sure to wash your hands again.  Call your healthcare provider if you see any of the following signs of a problem:   · Bleeding that soaks the dressing  · Pink fluid weeping from the wound  · Increased drainage or drainage that is yellow, yellow-green, or foul-smelling  · Increased swelling or pain, or redness or swelling in the skin around the wound  · A change in the color of the wound  · An increase in the size of the wound  · A fever over 101.0°F, increased fatigue, or a loss of appetite.       ©  4381-7395 [facility], [address] [place] [address], [place] [address]. All rights reserved. This information is not intended as a substitute for professional medical care. Always follow your healthcare professional's instructions.      You may shower using a mild soap such as Dove.  Irrigate the wound with lukewarm water for 5 minutes and dry thoroughly.  Apply medihoney gel to the wound, cover with cotton gauze and secure with paper tape.  Change dressing daily.  Report any signs of infection.

## 2018-06-11 NOTE — TELEPHONE ENCOUNTER
----- Message from Sherron Hale sent at 6/11/2018 11:01 AM CDT -----  Contact: Pt   Pt was calling to schedule an appt.    Pt would like a call back [phone].    Thank You

## 2018-06-14 NOTE — LETTER
June 16, 2018      Colton Rivers MD  [address] [facility] [address]           [facility]  [address] [facility] [address]  Phone: [phone]  Fax: [phone]          Patient: Francisca Longoria   MR Number: [id number]   YOB: 1975   Date of Visit: 6/14/2018       Dear Dr. Colton Rivers:    Thank you for referring Francisca Longoria to me for evaluation. Attached you will find relevant portions of my assessment and plan of care.    If you have questions, please do not hesitate to call me. I look forward to following Francisca Longoria along with you.    Sincerely,    Marco A Frank MD    Enclosure  CC:  No Recipients    If you would like to receive this communication electronically, please contact [email or web address] or [phone] to request more information on [email or web address] Link access.    For providers and/or their staff who would like to refer a patient to [facility], please contact us through our one-stop-shop provider referral line, [facility], at [phone].    If you feel you have received this communication in error or would no longer like to receive these types of communications, please e-mail [email or web address]

## 2018-06-14 NOTE — TELEPHONE ENCOUNTER
----- Message from Joie Clarke RN sent at 6/14/2018 12:01 PM CDT -----  Regarding: phenergan prescription  Hey Dr. Rivers and Cary    We saw Ms. Longoria today after they attempted her biopsy. They weren't able to get it, so Dr. Frank will do it surgically next Friday in the OR. I'll get with the research team about what time is best.     Ms. Longoria was telling us she is out of phenergan. She asked us to let you know so you could refill it. She has Zofran but only about 15 more, if you could just refill that as well. She was in so much pain after the attempted biopsy I felt horrible for her :(     Thanks so much for your help!

## 2018-06-16 PROBLEM — R59.0 LYMPHADENOPATHY, AXILLARY: Status: ACTIVE | Noted: 2018-01-01

## 2018-06-17 NOTE — PROGRESS NOTES
Patient ID: Francisca Longoria is a 43 y.o. female.     Chief Complaint:  Breast Cancer     HPI      43 year old female, refrred by Dr. Flores, to clinic today for follow-up evaluation of clinical trials. She recently went to [facility] for second opinion, and was treated there with one dose of IV chemo (unknown type) and four days of Xeloda.  She has been off therapy completely for a few weeks.  Today, she notes 10/10 pain in the R axilla and arm related to her metastases there.       Pertinent social history - single mother with 4 children and additional grandkids, one child has been in senior care recently, one daughter has children that patient cares for and is often responsible for, one child recently graduated college after playing college football. Her mother lives nearby and she has a boyfriend that is very supportive. 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 No tobacco, alcohol, drugs.       Oncologic History (From Chart):  DIAGNOSIS:   Stage IIIC (cT2 cN3c cM0) IDC of right breast. Triple negative. BRCA neg. Dx 6/2016   Recurrence R axilla 4/2018     HISTORY OF PRESENT ILNESS:   43yo female here for diagnosis breast cancer 6/24/16, initial consult 7/19/16.   6/13/16 - MMG 3.8cm mass in middle of R breast. U/S Irreg 3.3cm mass. Numerous R axilla LN, largest 1.9cm.   6/22/16 - right breast core biopsy - IDC, grade 3.ER 0%, MT 0%, HER2-aries neg.  7/13/16 - U/S guided core bx axilla -   7/19/16 - initial med onc consult  7/25/16 shows 3.8cm mass in the breast, 2cm LN in the axilla, and 1.3cm ipsilateral supraclav LN FDG-avid.  8/2016 - chemotherapy -  completed AC with minimal clinical response   9/14/16 - U/S - MASS UNCHANGED, no evidence of response, no progression. LAD slight smaller per U/S.   9/2016 - chemo - started taxol with carboplatin added. Completed 11th weekly taxol, 12th held due to persistent SE.   1/2017 - Mastectomy and axillary dissection. ypT2 ypN1a with residual 3 LN positive for  "macromets and clinical N3   supraclav node at time of diagnosis, this node not palpated at surgery.  3/2017 - 5/2017 - Adjuvant radiation completed with multiple breaks   8/2017: PETCT shows evidence of recurrence in right axilla.  Multiple hypermetabolic right axillary lymph nodes including a suspected right prepectoral lymph node, consistent with metastatic disease.  9/20/17 - U/S guided excisional biopsy, LN shows fat necrosis and no residual tumor.   12/12/17 - She has had reconstruction and has pain at right breast and swelling. She otherwise had no other complaints.   2/27/18 - CTA chest during ED visit - irreg 4cm mass-like area of mixed density suspicious for LAD in R axilla.   3/5/18 - CT head without con - POOJA.   3/6/2018 - patient presents after multiple ED visits for pain to right axilla, she has not been able to schedule follow-up with her surgeon to assess, been placed on abx with no improvement and pain meds with minimal control. No other new symptoms.  Still working, has to for insurance and financial support, pain extremely limiting and severe. She can move arm but painful. No numbness.      3/12/2018- US soft tissue axilla- Irregular heterogeneous hypoechoic masslike region within the right axilla deep to the patient's operative scar.   3/13/2018- she has seen surgeon for second opinion and he recommended return to her primary surgeon who can hopefully see her soon. She continues with severe pain and she feels "heat" to the area of axilla, now with dec ROM. Pain wakes her at night. Not using right arm as much. No fever.      We did order PET/CT 3/2018 but she cannot afford this.   4/3/18 - simple excision right axillary mass - 2cm mass shows grade 3 IDC of breast, large central area of necrosis. Tumor present at margin. ER is negative, SC is negative, HER2 is zero, negative, by IHC.     4/9/18 med onc follow-up - still with pain but mild better. Tearful and upset during visit today. Social issues " and new diagnosis of recurrence discussed today. She is taking pain meds with some relief. No new symptoms otherwise.   4/13/18 - CT brain, chest, ab, pelvis - POOJA except known axillary abnormalities.   4/20/18 - surgery eval - not resectable  4/26/18  - discussed if not resectable, cannot treat with intention to cure. She is very upset about this.     Review of Systems    Objective:   Physical Exam       LABS:        WBC   Date Value Ref Range Status   06/01/2018 3.90 3.90 - 12.70 K/uL Final            Hemoglobin   Date Value Ref Range Status   06/01/2018 10.0 (L) 12.0 - 16.0 g/dL Final            Hematocrit   Date Value Ref Range Status   06/01/2018 30.1 (L) 37.0 - 48.5 % Final            Platelets   Date Value Ref Range Status   06/01/2018 379 (H) 150 - 350 K/uL Final        Chemistry               Component Value Date/Time      06/01/2018 0619     K 4.1 06/01/2018 0619      06/01/2018 0619     CO2 27 06/01/2018 0619     BUN 9 06/01/2018 0619     CREATININE 0.80 06/01/2018 0619     GLU 92 06/01/2018 0619     GLU 97 12/28/2016 1003               Component Value Date/Time     CALCIUM 9.3 06/01/2018 0619     ALKPHOS 49 06/01/2018 0619     AST 32 06/01/2018 0619     ALT 33 06/01/2018 0619     BILITOT 0.5 06/01/2018 0619     ESTGFRAFRICA >60 06/01/2018 0619     EGFRNONAA >60 06/01/2018 0619             Assessment:       1. Malignant neoplasm of upper-outer quadrant of right breast in female, estrogen receptor negative    2. Local recurrence of malignant tumor of right breast    3. Cancer associated pain    4. Acute deep vein thrombosis (DVT) of right upper extremity, unspecified vein        Plan:         1.  Metastatic breast cancer:     Long discussion with the patient her family about current options here for clinical trials.  She has recently been seen by [facility], as noted above, and received 1 dose of IV chemotherapy there along with 4 days of oral Xeloda.  She discontinued  the Xeloda after this.  She has been off therapy for a couple weeks now.  She is no longer eligible for the [employer] breast cancer trial specifically for triple negative breast cancer due to the fact that she received therapy in [place].  We will move ahead with the move fresh biopsy of the right axilla and plan for tumor sensitivity testing via the can script trial as well as next radiation sequencing the of the strata trial.  She consented with our research nurse Sangeetha today.     2.  Pain:     - the patient was in significant pain due to swelling in her right upper extremity secondary to progressive disease, and perhaps a recently diagnosed DVT.   - will increase fentanyl to 75 mcg transdermal patch, and increased oxycodone to 10 mg p.o. q.3 hours p.r.n..  - also referred to pain management, as well as wound care and lymphedema Clinic.     3.  Psychosocial:  - refer to Heme-Onc psychology.     4- continue current anticoagulation.     Note per Dr. Rivers referenced as above.  No significant interval changes.  Patient with inability to extend the right arm at shoulder with lymphedema of RUE as well.  Pt is right handed.  Because of immobility of the the R arm, she was not able to have an US guided core needle biopsy of the R axilla today.  I have scheduled her for a Right axillary incisional biopsy of the axillary LAD in main OR on Friday 6-22-18 under general anesthesia.  We will submit sample of tumor for clinical trial outlined above.  Pt instructed to hold Eliquis for 3 full days prior to her surgery at the end of next week on Fri 6-22-18.

## 2018-06-18 PROBLEM — F43.23 ADJUSTMENT DISORDER WITH MIXED ANXIETY AND DEPRESSED MOOD: Status: ACTIVE | Noted: 2018-01-01

## 2018-06-18 NOTE — LETTER
June 18, 2018        Colton Rivers MD  [address] [facility] [address]             [facility]  [address] [facility] [address]  Phone: [phone]  Fax: [phone]   Patient: Francisca Longoria   MR Number: [id number]   YOB: 1975   Date of Visit: 6/18/2018       Dear Dr. Rivers:    Thank you for referring Francisca Longoria to me for evaluation. Below are the relevant portions of my assessment and plan of care.     Francisca Longoria is a 43 y.o. female patient of Dr. Flores referred by Dr. Rivers for psychological evaluation and treatment.  The evaluation for Ms. Longoria was abbreviated due to her intense and growing pain during the visit.  Ms. Longoria is without significant psychiatric history, but is experiencing depression and anxiety in response to her cancer recurrence and pain management challenges. Given her struggles with pain, she may benefit from consideration of an antidepressant with pain modulating properties. She was also encouraged to recontact Dr. Flores today (as per Dr. Flores's most recent note) to discuss pain management progress and ongoing needs. She is also interested in CBT/supportive therapy to address depression/anxiety/adjustment to recurrence/prognosis and will follow up with me for that purpose.     If you have questions, please do not hesitate to call me. I look forward to following Francisca along with you.    Sincerely,      Bautista Escobedo, PhD           CC  Milka Flores MD

## 2018-06-18 NOTE — PROGRESS NOTES
PSYCHO-ONCOLOGY INTAKE    Diagnostic Interview - CPT 81839    Date: 6/18/2018  Site: [facility]     Evaluation Length (direct face-to-face time):  45 minutes     Referral Source: Dr. Rivers Oncologist: Dr. Flores   PCP: Primary Doctor No    Clinical status of patient: Outpatient    Francisca Longoria, a 43 y.o. female, seen for initial evaluation visit.  Met with patient, mother, friend and partner.    Chief complaint/reason for encounter: adjustment to illness, depression, anger and anxiety    Medical/Surgical History:    Patient Active Problem List   Diagnosis    Bartholin's gland abscess    Abnormal uterine bleeding    Malignant neoplasm of upper-outer quadrant of right breast in female, estrogen receptor negative    Non morbid obesity    Neuropathy due to chemotherapeutic drug    Neuropathic pain of both legs    Local recurrence of malignant tumor of right breast    Cancer associated pain    Acute deep vein thrombosis (DVT) of right upper extremity    Anxiety    Chest pain    Constipation    Open wound of right female breast    Lymphadenopathy, axillary    Adjustment disorder with mixed anxiety and depressed mood       Health Behaviors:       ETOH Use: No    Tobacco Use: No   Illicit Drug Use:  No     Prescription Misuse:No   Caffeine: minimal    Family History: unknown     Past Psychiatric History:   Inpatient treatment: No     Outpatient treatment: No     Prior substance abuse treatment: No     Suicide Attempts: No     Psychotropic Medications: Past: None     Current: Xanax      Current medications below, allergies reviewed in chart.  Current Outpatient Prescriptions   Medication    ALPRAZolam (XANAX) 1 MG tablet    apixaban (ELIQUIS) 5 mg Tab    docusate sodium (COLACE) 100 MG capsule    lactulose (CHRONULAC) 10 gram/15 mL solution    morphine (MS CONTIN) 30 MG 12 hr tablet    ondansetron (ZOFRAN-ODT) 4 MG TbDL    ondansetron (ZOFRAN-ODT) 8 MG TbDL    oxyCODONE (ROXICODONE) 10 mg  "Tab immediate release tablet    promethazine (PHENERGAN) 25 MG tablet     No current facility-administered medications for this visit.         CAM Therapies: None     Screening:  Elizabeth: Denies Psychosis: Denies    Social/specific phobia: Denies OCD: Denies   Trauma: Denies      Social situation/Stressors: Francisca Longoria lives with her boyfriend and several of her children/grandchildren in [place], [place].  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  She is still attempting to work.  She has been denied by SSDI x 2 (at initial diagnosis, at recurrence).  Francisca Longoria has 4 children and 5 grandchildren.  The patient reports good social support from her mother, boyfriend, and friend.  She is very distressed about others who have not been adequately supportive.  Francisca Longoria is active in her [employer] chet.  Additional stressors: son in group home, youngest son "doesn't have anyone but me because his dad is dead," non-support by others, denied SSDI    Strengths:Able to vocalize needs, Motivation, readiness for change and Interpersonal relationships and supports available - family, relatives, friends  Liabilities: Complicated medical illness and Financial strain    Current Evaluation:     Mental Status Exam: Francisca Longoria arrived promptly for the assessment session. Her mother, boyfriend, and friend were also present during the interview, with the consent of Francisca Longoria.  The patient was fully cooperative throughout the interview, but her ability as a historian was limited by pain distraction    Appearance: age appropriate,  casually dressed, minimally groomed  Behavior/Cooperation: friendly and cooperative  Speech:normal in rate, volume, and tone   Mood: anxious, depressed  Affect: tearful, distressed, in visible pain  Thought Process: goal-directed, logical  Thought Content: normal, no suicidality, no homicidality, delusions, or paranoia;did not appear to be responding to internal stimuli during the interview. "   Orientation: grossly intact  Memory: Grossly intact  Attention Span/Concentration: Attends to interview with significant distraction due to pain, reports subjective difficulty  Fund of Knowledge: average  Estimate of Intelligence: average from verbal skills and history  Cognition: grossly intact  Insight: patient has awareness of illness; good insight into own behavior and behavior of others  Judgment: the patient's behavior is adequate to circumstances    Distress Score    Distress Score: 5        Practical Problems Physical Problems           Bathing / Dressing: Yes     Breathing: Yes            Work / School: Yes  Constipation: Yes   Treatment Decisions: Yes             Family Problems Fatigue: Yes    Dealing with Children: Yes                Getting Around: Yes                 Emotional Problems      Depression: Yes  Nausea: Yes    Fears: Yes          Pain: Yes    Sadness: Yes      Worry: Yes                   Spiritual/Religions Concerns Tingling in Hands / Feet: Yes             Other Problems            MMSE:     Pain: 10    History of present illness:  As per Dr. Rivers: 43 year old female, referred by Dr. Flores. She recently went to [facility] for second opinion, and was treated there with one dose of IV chemo (unknown type) and four days of Xeloda.  She has been off therapy completely for a few weeks.  Today, she notes 10/10 pain in the R axilla and arm related to her metastases there.       Oncologic History (From Chart):  DIAGNOSIS:   Stage IIIC (cT2 cN3c cM0) IDC of right breast. Triple negative. BRCA neg. Dx 6/2016   Recurrence R axilla 4/2018     HISTORY OF PRESENT ILNESS:   Diagnosis of breast cancer 6/24/16, initial consult 7/19/16.   6/13/16 - MMG 3.8cm mass in middle of R breast. U/S Irreg 3.3cm mass. Numerous R axilla LN, largest 1.9cm.   6/22/16 - right breast core biopsy - IDC, grade 3.ER 0%, NE 0%, HER2-aries neg.  7/13/16 - U/S guided core bx axilla -   7/19/16 - initial med  onc consult  7/25/16 shows 3.8cm mass in the breast, 2cm LN in the axilla, and 1.3cm ipsilateral supraclav LN FDG-avid.  8/2016 - chemotherapy -  completed AC with minimal clinical response   9/14/16 - U/S - MASS UNCHANGED, no evidence of response, no progression. LAD slight smaller per U/S.   9/2016 - chemo - started taxol with carboplatin added. Completed 11th weekly taxol, 12th held due to persistent SE.   1/2017 - Mastectomy and axillary dissection. ypT2 ypN1a with residual 3 LN positive for macromets and clinical N3   supraclav node at time of diagnosis, this node not palpated at surgery.  3/2017 - 5/2017 - Adjuvant radiation completed with multiple breaks   8/2017: PETCT shows evidence of recurrence in right axilla.  Multiple hypermetabolic right axillary lymph nodes including a suspected right prepectoral lymph node, consistent with metastatic disease.  9/20/17 - U/S guided excisional biopsy, LN shows fat necrosis and no residual tumor.   12/12/17 - She has had reconstruction and has pain at right breast and swelling. She otherwise had no other complaints.   2/27/18 - CTA chest during ED visit - irreg 4cm mass-like area of mixed density suspicious for LAD in R axilla.   3/5/18 - CT head without con - POOJA.   3/6/2018 - patient presents after multiple ED visits for pain to right axilla, she has not been able to schedule follow-up with her surgeon to assess, been placed on abx with no improvement and pain meds with minimal control. No other new symptoms.  Still working, has to for insurance and financial support, pain extremely limiting and severe. She can move arm but painful. No numbness.      3/12/2018- US soft tissue axilla- Irregular heterogeneous hypoechoic masslike region within the right axilla deep to the patient's operative scar.   3/13/2018- she has seen surgeon for second opinion and he recommended return to her primary surgeon who can hopefully see her soon. She continues with severe pain and she  "feels "heat" to the area of axilla, now with dec ROM. Pain wakes her at night. Not using right arm as much. No fever.      We did order PET/CT 3/2018 but she cannot afford this.   4/3/18 - simple excision right axillary mass - 2cm mass shows grade 3 IDC of breast, large central area of necrosis. Tumor present at margin. ER is negative, OH is negative, HER2 is zero, negative, by IHC.     4/9/18 med onc follow-up - still with pain but mild better. Tearful and upset during visit today. Social issues and new diagnosis of recurrence discussed today. She is taking pain meds with some relief. No new symptoms otherwise.   4/13/18 - CT brain, chest, ab, pelvis - POOJA except known axillary abnormalities.   4/20/18 - surgery eval - not resectable  4/26/18  - discussed if not resectable, cannot treat with intention to cure. She is very upset about this.    Francisca Longoria has adjusted to illness fairly well, but has adapted to her recurrence with difficulty primarily through active coping strategies, prayer and time with family and friends. She has engaged in appropriate information gathering.  The patient has excellent family/friend support.  Her support system is coping adequately with the diagnosis/treatment/prognosis.  She states each of her children is "coping on their own."  Illness related psychosocial stressors include  difficulties, financial strain, absence from work and difficulty meeting family responsibiilties. She is in visible, severe pain.  Family members note that pain is interfering with sleep and eating.  Patient has soiled herself at night due to fear of increased pain if she gets up to go to the bathroom. The patient is still attempting to work due to fear of losing health insurance if she stops. Patient was unable to even tolerate the entirety of a 1 hour intake due to intense pain. She states she is very worried about her recurrence and overwhelmed by knowledge of her likely lifespan ("I shouldn't " "have asked about it.  Now I can't stop thinking about it.").  Patient repeatedly stated that her chet provides her hope and she believes she will "beat this."  She stated, "I phillip some good news now. I never get to have good news." She is frequently overwhelmed about her illness and feels "like a burden" to her loved ones. The patient has an excellent partnership with her oncology treatment team (especially Dr. Flores). The patient reports the following barriers to cancer care: insurance coverage and transportation.    Symptoms:   · Mood: depressed mood, hypersomnolence ("sleep to cope"), psychomotor retardation, fatigue, worthlessness/guilt, thoughts of death, tearfulness and social isolation;  no prior and no SI/HI; PHQ-9=11  · Anxiety: decreased memory, excessive anxiety/worry, irritability and muscle tension; worries about fate of children, grandchildren, boyfriend without her, when in extreme pain, worry sometimes produces panic, no prior;  LIU-7=11  · Substance abuse: denied  · Cognitive functioning: denied  · Health behaviors: noncontributory, recent change in diet to attempt to live more healthfully ("no more fried foods, no more sweets")  · Sleep: struggles to sleep due to pain and limitations in positions where she can get comfortable    · Pain: Ms. Longoria reports 10/10 pain. The pain is rated moderate on the BEST day and severe, intense, unremitting on the WORST DAY.  The pain is located at the right arm and breast. The pain is described as sharp and stabbing and occurs all day. Symptoms are exacerbated by movement. Symptoms interfere with daily activity, sleeping and work.       Assessment - Diagnosis - Goals:       ICD-10-CM ICD-9-CM   1. Cancer associated pain G89.3 338.3   2. Adjustment disorder with mixed anxiety and depressed mood F43.23 309.28       Plan:individual psychotherapy and medication management by physician    Summary and Recommendations  Francisca Longoria is a 43 y.o. female patient of Dr." Mark referred by Dr. Rivers for psychological evaluation and treatment.  The evaluation for Ms. Longoria was abbreviated due to her intense and growing pain during the visit.  Ms. Longoria is without significant psychiatric history, but is experiencing depression and anxiety in response to her cancer recurrence and pain management challenges. Given her struggles with pain, she may benefit from consideration of an antidepressant with pain modulating properties. She was also encouraged to recontact Dr. Flores today (as per Dr. Flores's most recent note) to discuss pain management progress and ongoing needs. She is also interested in CBT/supportive therapy to address depression/anxiety/adjustment to recurrence/prognosis and will follow up with me for that purpose.      Bautista Rodriguez, PhD  Clinical Psychologist  [place] License #[id number]

## 2018-06-22 PROBLEM — R22.31 AXILLARY MASS, RIGHT: Status: ACTIVE | Noted: 2018-01-01

## 2018-06-22 NOTE — PROGRESS NOTES
June 22, 2017     Protocol: cANscript Clinical Outcomes in a Real-World Setting (ANCERS)-2: A Prospective, Multicenter, Observational Study Examining the Clinical Utility of cANscript in Routine Clinical Practice.  Protocol # [id number]  IRB # 2017.435.A  PI: Colton Rivers  Version Date: 09-Sept-2017     Tissue biopsy :     Tissue biopsy and blood obtained today in OR.   Per chart pt tolerated procedure well. Two large pieces of tissue sent via [employer] to [facility] for testing.

## 2018-06-22 NOTE — PLAN OF CARE
Patient tolerated procedure/anesthesia well, vss, no distress noted or reported. Pain well controlled and tolerable. Denies nausea, tolerates PO. Discharge instructions reviewed with family, verbalized understanding, consents with chart.

## 2018-06-22 NOTE — H&P (VIEW-ONLY)
Patient ID: Francisca Longoria is a 43 y.o. female.     Chief Complaint:  Breast Cancer     HPI      43 year old female, refrred by Dr. Flores, to clinic today for follow-up evaluation of clinical trials. She recently went to [facility] for second opinion, and was treated there with one dose of IV chemo (unknown type) and four days of Xeloda.  She has been off therapy completely for a few weeks.  Today, she notes 10/10 pain in the R axilla and arm related to her metastases there.       Pertinent social history - single mother with 4 children and additional grandkids, one child has been in [facility] recently, one daughter has children that patient cares for and is often responsible for, one child recently graduated college after playing college football. Her mother lives nearby and she has a boyfriend that is very supportive. 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 No tobacco, alcohol, drugs.       Oncologic History (From Chart):  DIAGNOSIS:   Stage IIIC (cT2 cN3c cM0) IDC of right breast. Triple negative. BRCA neg. Dx 6/2016   Recurrence R axilla 4/2018     HISTORY OF PRESENT ILNESS:   43yo female here for diagnosis breast cancer 6/24/16, initial consult 7/19/16.   6/13/16 - MMG 3.8cm mass in middle of R breast. U/S Irreg 3.3cm mass. Numerous R axilla LN, largest 1.9cm.   6/22/16 - right breast core biopsy - IDC, grade 3.ER 0%, AL 0%, HER2-aries neg.  7/13/16 - U/S guided core bx axilla -   7/19/16 - initial med onc consult  7/25/16 shows 3.8cm mass in the breast, 2cm LN in the axilla, and 1.3cm ipsilateral supraclav LN FDG-avid.  8/2016 - chemotherapy -  completed AC with minimal clinical response   9/14/16 - U/S - MASS UNCHANGED, no evidence of response, no progression. LAD slight smaller per U/S.   9/2016 - chemo - started taxol with carboplatin added. Completed 11th weekly taxol, 12th held due to persistent SE.   1/2017 - Mastectomy and axillary dissection. ypT2 ypN1a with residual 3 LN positive for  "macromets and clinical N3   supraclav node at time of diagnosis, this node not palpated at surgery.  3/2017 - 5/2017 - Adjuvant radiation completed with multiple breaks   8/2017: PETCT shows evidence of recurrence in right axilla.  Multiple hypermetabolic right axillary lymph nodes including a suspected right prepectoral lymph node, consistent with metastatic disease.  9/20/17 - U/S guided excisional biopsy, LN shows fat necrosis and no residual tumor.   12/12/17 - She has had reconstruction and has pain at right breast and swelling. She otherwise had no other complaints.   2/27/18 - CTA chest during ED visit - irreg 4cm mass-like area of mixed density suspicious for LAD in R axilla.   3/5/18 - CT head without con - POOJA.   3/6/2018 - patient presents after multiple ED visits for pain to right axilla, she has not been able to schedule follow-up with her surgeon to assess, been placed on abx with no improvement and pain meds with minimal control. No other new symptoms.  Still working, has to for insurance and financial support, pain extremely limiting and severe. She can move arm but painful. No numbness.      3/12/2018- US soft tissue axilla- Irregular heterogeneous hypoechoic masslike region within the right axilla deep to the patient's operative scar.   3/13/2018- she has seen surgeon for second opinion and he recommended return to her primary surgeon who can hopefully see her soon. She continues with severe pain and she feels "heat" to the area of axilla, now with dec ROM. Pain wakes her at night. Not using right arm as much. No fever.      We did order PET/CT 3/2018 but she cannot afford this.   4/3/18 - simple excision right axillary mass - 2cm mass shows grade 3 IDC of breast, large central area of necrosis. Tumor present at margin. ER is negative, [place] is negative, HER2 is zero, negative, by IHC.     4/9/18 med onc follow-up - still with pain but mild better. Tearful and upset during visit today. Social issues " and new diagnosis of recurrence discussed today. She is taking pain meds with some relief. No new symptoms otherwise.   4/13/18 - CT brain, chest, ab, pelvis - POOJA except known axillary abnormalities.   4/20/18 - surgery eval - not resectable  4/26/18  - discussed if not resectable, cannot treat with intention to cure. She is very upset about this.     Review of Systems    Objective:   Physical Exam       LABS:        WBC   Date Value Ref Range Status   06/01/2018 3.90 3.90 - 12.70 K/uL Final            Hemoglobin   Date Value Ref Range Status   06/01/2018 10.0 (L) 12.0 - 16.0 g/dL Final            Hematocrit   Date Value Ref Range Status   06/01/2018 30.1 (L) 37.0 - 48.5 % Final            Platelets   Date Value Ref Range Status   06/01/2018 379 (H) 150 - 350 K/uL Final        Chemistry               Component Value Date/Time      06/01/2018 0619     K 4.1 06/01/2018 0619      06/01/2018 0619     CO2 27 06/01/2018 0619     BUN 9 06/01/2018 0619     CREATININE 0.80 06/01/2018 0619     GLU 92 06/01/2018 0619     GLU 97 12/28/2016 1003               Component Value Date/Time     CALCIUM 9.3 06/01/2018 0619     ALKPHOS 49 06/01/2018 0619     AST 32 06/01/2018 0619     ALT 33 06/01/2018 0619     BILITOT 0.5 06/01/2018 0619     ESTGFRAFRICA >60 06/01/2018 0619     EGFRNONAA >60 06/01/2018 0619             Assessment:       1. Malignant neoplasm of upper-outer quadrant of right breast in female, estrogen receptor negative    2. Local recurrence of malignant tumor of right breast    3. Cancer associated pain    4. Acute deep vein thrombosis (DVT) of right upper extremity, unspecified vein        Plan:         1.  Metastatic breast cancer:     Long discussion with the patient her family about current options here for clinical trials.  She has recently been seen by [facility], as noted above, and received 1 dose of IV chemotherapy there along with 4 days of oral Xeloda.  She discontinued  the Xeloda after this.  She has been off therapy for a couple weeks now.  She is no longer eligible for the [employer] breast cancer trial specifically for triple negative breast cancer due to the fact that she received therapy in [place].  We will move ahead with the move fresh biopsy of the right axilla and plan for tumor sensitivity testing via the can script trial as well as next radiation sequencing the of the strata trial.  She consented with our research nurse Sangeetha today.     2.  Pain:     - the patient was in significant pain due to swelling in her right upper extremity secondary to progressive disease, and perhaps a recently diagnosed DVT.   - will increase fentanyl to 75 mcg transdermal patch, and increased oxycodone to 10 mg p.o. q.3 hours p.r.n..  - also referred to pain management, as well as wound care and lymphedema Clinic.     3.  Psychosocial:  - refer to Heme-Onc psychology.     4- continue current anticoagulation.     Note per Dr. Rivers referenced as above.  No significant interval changes.  Patient with inability to extend the right arm at shoulder with lymphedema of RUE as well.  Pt is right handed.  Because of immobility of the the R arm, she was not able to have an US guided core needle biopsy of the R axilla today.  I have scheduled her for a Right axillary incisional biopsy of the axillary LAD in main OR on Friday 6-22-18 under general anesthesia.  We will submit sample of tumor for clinical trial outlined above.  Pt instructed to hold Eliquis for 3 full days prior to her surgery at the end of next week on Fri 6-22-18.

## 2018-06-22 NOTE — ANESTHESIA PROCEDURE NOTES
Paravertebral    Patient location during procedure: pre-op   Block not for primary anesthetic.  Reason for block: at surgeon's request and post-op pain management   Post-op Pain Location: right breast  Start time: 6/22/2018 6:46 AM  Timeout: 6/22/2018 6:45 AM   End time: 6/22/2018 6:55 AM  Staffing  Anesthesiologist: KARRIE PATRICK  Performed: anesthesiologist   Preanesthetic Checklist  Completed: patient identified, site marked, surgical consent, pre-op evaluation, timeout performed, IV checked, risks and benefits discussed and monitors and equipment checked  Peripheral Block  Patient position: sitting  Prep: ChloraPrep  Patient monitoring: heart rate, cardiac monitor, continuous pulse ox, continuous capnometry and frequent blood pressure checks  Block type: paravertebral - thoracic  Laterality: right  Injection technique: single shot  Needle  Needle type: Tuohy   Needle gauge: 17 G  Needle length: 3.5 in  Needle localization: anatomical landmarks     Assessment  Injection assessment: negative aspiration and negative parasthesia  Paresthesia pain: none  Heart rate change: no  Slow fractionated injection: yes  Medications:  Bolus administered: 30 mL of 0.5 ropivacaine  Epinephrine added: 3.75 mcg/mL (1/300,000)  Additional Notes    VSS.  [facility] RN monitoring vitals throughout procedure.  Patient tolerated procedure well.

## 2018-06-22 NOTE — PROGRESS NOTES
Report given to Ludmila VILLANUEVA. Reported patient was in pain. Anesthesia notified.   
ED Record Reviewed

## 2018-06-22 NOTE — ANESTHESIA PREPROCEDURE EVALUATION
06/22/2018  Francisca Longoria is a 43 y.o., female.    Anesthesia Evaluation    I have reviewed the Patient Summary Reports.    I have reviewed the Nursing Notes.   I have reviewed the Medications.     Review of Systems  Anesthesia Hx:  No problems with previous Anesthesia  Denies Family Hx of Anesthesia complications.   Denies Personal Hx of Anesthesia complications.   Hematology/Oncology:         -- Anemia: Current/Recent Cancer. Breast Oncology Comments: Malignant neoplasm of upper-outer quadrant of right breast in female, estrogen receptor negative  Non morbid obesity  Neuropathy due to chemotherapeutic drug  Neuropathic pain of both legs  Local recurrence of malignant tumor of right breast  Cancer associated pain        Cardiovascular:   Exercise tolerance: good  Functional Capacity good / => 4 METS    Hepatic/GI:   GERD    Neurological:   Headaches    Endocrine:  Metabolic Disorders, Obesity / BMI > 30  Psych:   anxiety          Physical Exam   Airway/Jaw/Neck:  Airway Findings: Mouth Opening: Normal Tongue: Normal  General Airway Assessment: Adult, Good  TM Distance: Normal, at least 6 cm       Chest/Lungs:  Chest/Lungs Findings: Normal Respiratory Rate         Mental Status:  Mental Status Findings:  Cooperative, Alert and Oriented         Anesthesia Plan  Type of Anesthesia, risks & benefits discussed:  Anesthesia Type:  general  Patient's Preference: General  Intra-op Monitoring Plan: standard ASA monitors  Intra-op Monitoring Plan Comments:   Post Op Pain Control Plan: peripheral nerve block, per primary service following discharge from PACU and multimodal analgesia  Post Op Pain Control Plan Comments: Per primary service  Induction:   IV  Beta Blocker:  Patient is not currently on a Beta-Blocker (No further documentation required).       Informed Consent: Patient understands risks and agrees with  Anesthesia plan.  Questions answered. Anesthesia consent signed with patient.  ASA Score: 3     Day of Surgery Review of History & Physical:    H&P update referred to the surgeon.  H&P completed by Anesthesiologist.       Ready For Surgery From Anesthesia Perspective.

## 2018-06-22 NOTE — INTERVAL H&P NOTE
The patient has been examined and the H&P has been reviewed:    I concur with the findings and changes have been noted since the H&P was written: Patient states she had constipation which she went to the ED fo several days ago, has since had a bowl movement and states she was not hospitalized at the time. Otherwise, no changes to the H&P today, to the OR for right axillary incisional biopsy of lymphadenopathy.    Anesthesia/Surgery risks, benefits and alternative options discussed and understood by patient/family.          Active Hospital Problems    Diagnosis  POA    Axillary mass, right [R22.31]  Yes      Resolved Hospital Problems    Diagnosis Date Resolved POA   No resolved problems to display.

## 2018-06-22 NOTE — BRIEF OP NOTE
[facility]  Brief Operative Note     SUMMARY     Surgery Date: 6/22/2018     Surgeon(s) and Role:     * Marco A Frank MD - Primary     * Noah Urban MD - Resident - Assisting        Pre-op Diagnosis:  Malignant neoplasm of upper-outer quadrant of right breast in female, estrogen receptor negative [C50.411, Z17.1]    Post-op Diagnosis:  Post-Op Diagnosis Codes:     * Malignant neoplasm of upper-outer quadrant of right breast in female, estrogen receptor negative [C50.411, Z17.1]    Procedure(s) (LRB):  INCISIONAL BIOPSY (Right)    Anesthesia: General    Description of the findings of the procedure: Axilla with gross invasion of tumor.  2cm x 1cm superficial excsisonal biopsy    Findings/Key Components: As above    Estimated Blood Loss:minimal         Specimens:   Specimen (12h ago through future)    None          Discharge Note    SUMMARY     Admit Date: 6/22/2018    Discharge Date and Time:  06/22/2018 8:15 AM    Hospital Course (synopsis of major diagnoses, care, treatment, and services provided during the course of the hospital stay): OP surgery     Final Diagnosis: Post-Op Diagnosis Codes:     * Malignant neoplasm of upper-outer quadrant of right breast in female, estrogen receptor negative [C50.411, Z17.1]    Disposition: Home or Self Care    Follow Up/Patient Instructions:     Medications:  Reconciled Home Medications:      Medication List      CHANGE how you take these medications    ondansetron 4 MG Tbdl  Commonly known as:  ZOFRAN-ODT  Take 1 tablet (4 mg total) by mouth every 8 (eight) hours as needed (nausea).  What changed:  when to take this        CONTINUE taking these medications    ALPRAZolam 1 MG tablet  Commonly known as:  XANAX  1/2 to 1 mg by mouth 3 times a day as needed for anxiety. Only take when absolutely necessary     apixaban 5 mg Tab  Commonly known as:  ELIQUIS  Take 1 tablet (5 mg total) by mouth 2 (two) times daily.     docusate sodium 100 MG  capsule  Commonly known as:  COLACE  Take 1 capsule (100 mg total) by mouth 2 (two) times daily.     lactulose 10 gram/15 mL solution  Commonly known as:  CHRONULAC  TAKE 30 MLS BY MOUTH EVERY 4 HOURS AS NEEDED FOR CONSTIPATION     metoclopramide HCl 10 MG tablet  Commonly known as:  REGLAN  Take 1 tablet (10 mg total) by mouth every 6 (six) hours.     morphine 30 MG 12 hr tablet  Commonly known as:  MS CONTIN  Take 1 tablet (30 mg total) by mouth every 8 (eight) hours.     oxyCODONE 10 mg Tab immediate release tablet  Commonly known as:  ROXICODONE  Take 1 tablet (10 mg total) by mouth every 3 (three) hours as needed for Pain.     polyethylene glycol 17 gram Pwpk  Commonly known as:  GLYCOLAX  Take 17 g by mouth 2 (two) times daily.            Discharge Procedure Orders  Activity as tolerated     Notify your health care provider if you experience any of the following:  increased confusion or weakness     Notify your health care provider if you experience any of the following:  persistent dizziness, light-headedness, or visual disturbances     Notify your health care provider if you experience any of the following:  worsening rash     Notify your health care provider if you experience any of the following:  severe persistent headache     Notify your health care provider if you experience any of the following:  difficulty breathing or increased cough     Notify your health care provider if you experience any of the following:  redness, tenderness, or signs of infection (pain, swelling, redness, odor or green/yellow discharge around incision site)     Notify your health care provider if you experience any of the following:  severe uncontrolled pain     Notify your health care provider if you experience any of the following:  persistent nausea and vomiting or diarrhea     Notify your health care provider if you experience any of the following:  temperature >100.4     Remove dressing in 24 hours   Order Comments: Remove  gauze and packing in shower tomorrow.  Re-dress as needed.       Follow-up Information     Marco A Frank MD. Schedule an appointment as soon as possible for a visit in 2 weeks.    Specialty:  General Surgery  Contact information:  [address]  [facility] [id number] [phone]

## 2018-06-22 NOTE — OP NOTE
DATE OF PROCEDURE:  06/22/2018    PRIMARY SURGEON:  Marco A Frank M.D.    ASSISTANT SURGEON:  Noah Urban M.D. (RES), surgery resident.    PREOPERATIVE DIAGNOSIS:  Locally advanced and metastatic right breast carcinoma   with right axillary burden of disease due to local regional advancement and   progression of disease.    POSTOPERATIVE DIAGNOSIS:  Locally advanced and metastatic right breast carcinoma   with right axillary burden of disease due to local regional advancement and   progression of disease.    PROCEDURE:  Incisional biopsy of right axillary breast carcinoma and mass.    PROCEDURE IN DETAIL:  The patient underwent informed consent.  The history and   physical examination was reviewed and updated.  The right axilla was marked.    The patient underwent a regional block by the Regional Anesthesia team.  She was   brought to the Operating Room under general anesthesia.  She was in a supine   position.  The left arm and upper extremity was extended as much as possible to   expose the axilla.  There was some necrosis in the axilla.  The area was prepped   and draped in a sterile fashion with a Betadine solution.  Using a 10 blade   scalpel, we excised at least a cubic centimeter of viable carcinoma, which was   firm, hard and gray.  We trimmed any necrosis off of this tissue to permit only   the viable tissue for clinical study for which was the reason for this biopsy.    Once the incisional biopsy of tumor of the axilla was performed, the wound was   irrigated.  It made hemostatic with cautery.  It was packed with surgical   fibrillar and a 4 x 4 gauze.  She tolerated the procedure well without   complication.  Estimated blood loss was minimal.  All needle, instrument and   sponge counts were correct.  The specimen was submitted for a clinical trial.    She was turned over to Anesthesia for extubation and transferred to the Recovery   Area in a satisfactory condition.      DARCI/BREANA  dd: 06/22/2018  08:11:31 (CDT)  td: 06/22/2018 09:37:09 (REYNALDO)  Doc ID   #[id number]  Job ID #[id number]    CC:

## 2018-06-22 NOTE — DISCHARGE INSTRUCTIONS
Remove gauze and packing in shower tomorrow.  Re-dress as needed.      Surgical Breast Biopsy: Your Experience     Be sure to tell your healthcare provider about all medicines you take.     A surgical breast biopsy is done to remove a small piece of tissue from the breast. This tissue is then sent to a lab to be studied. Most surgical breast biopsies are done in a hospital or clinic. They are performed on an outpatient basis.  Understanding the risks  Risks that may happen with surgical biopsy include:  · Excessive bleeding or bruising  · Infection  · Problems from the anesthesia  · Poor wound healing  · Change in breast shape  · Failure to remove entire lesion  · False-negative result  Before the biopsy  Tell your surgeon about any medicines, vitamins, supplements, or herbs you take. This includes over-the-counter medicines, such as aspirin. Some of these may affect your bodys response during surgery. On the day of the biopsy, wear a loose shirt that buttons in front. Also, be sure to arrange for a trusted adult to drive you home.  After the biopsy  Usually you can go home the day of the biopsy. You may have bruising and swelling for a few days. If you need them, your surgeon may prescribe pain medicines. Ice packs can also help ease minor soreness or swelling. Be sure you know what type of pain medicine you can take if you need it. Leave your dressing on for as long as your surgeon suggests. Also, follow your surgeons advice about bathing and exercise.  · Don't do any physical work or strenuous activity for the first 24 hours after the procedure. You can usually return to your normal routine after this brief period of rest.  · Ask how long you should use a waterproof ice pack over the biopsy area, when your bandage can be taken off, and when you can take medicine, including aspirin, again.  · You may have a bruise for 1 to 2 weeks after the procedure. This is normal. You may also have a tiny scar. Ask about  wearing a supportive bra to help with discomfort.  · If you have fever, excessive bleeding, swelling, or other problems, call your healthcare provider.  · Ask your healthcare provider when you will get the results of the biopsy and who will explain them to you.  When to call your surgeon  Call your surgeon if you have any of these:  · A fever over 100.4°F (38°C)  · Increased pain, warmth, or redness at the puncture or incision site  · Severe swelling that doesnt go away in a few days  · Drainage from the puncture or incision site  · Bleeding that soaks through the dressing  Know how to reach your healthcare provider if you have problems or concerns. Be sure you know how to get help after office hours, on weekends, and on holidays, too.    Date Last Reviewed: 10/31/2015  © 5440-5279 [employer]. [address] [place] [address], [place] [address]. All rights reserved. This information is not intended as a substitute for professional medical care. Always follow your healthcare professional's instructions.

## 2018-06-22 NOTE — PLAN OF CARE
Research specimen sent to research Study CANSCRIJUSTIN for Dr Rivers- Specimen received and took directly to Front OR desk where research Sangeetha Gan took from OR desk directly.

## 2018-06-22 NOTE — TELEPHONE ENCOUNTER
Staff called and tried to contact patient regarding rescheduling her with Dr. Snell partner Dr. Brown, no answer left a message for patient to call back.

## 2018-06-25 NOTE — ANESTHESIA POSTPROCEDURE EVALUATION
"Anesthesia Post Evaluation    Patient: Francisca Longoria    Procedure(s) Performed: Procedure(s) (LRB):  INCISIONAL BIOPSY (Right)    Final Anesthesia Type: general  Patient location during evaluation: PACU  Patient participation: Yes- Able to Participate  Level of consciousness: awake and alert  Post-procedure vital signs: reviewed and stable  Pain management: adequate  Airway patency: patent  PONV status at discharge: No PONV  Anesthetic complications: no      Cardiovascular status: blood pressure returned to baseline and hemodynamically stable  Respiratory status: unassisted, spontaneous ventilation and room air  Hydration status: euvolemic  Follow-up not needed.        Visit Vitals  /89   Pulse 89   Temp 36.7 °C (98 °F) (Temporal)   Resp 18   Ht 5' 5" (1.651 m)   Wt 90.3 kg (199 lb)   LMP 01/01/2016 (Approximate)   SpO2 97%   Breastfeeding? No   BMI 33.12 kg/m²       Pain/Mitchell Score: No Data Recorded      "

## 2018-06-28 NOTE — PROGRESS NOTES
Subjective:       Patient ID: Francisca Longoria is a 43 y.o. female.    Chief Complaint:  Breast Cancer    HPI     43 year old female, refrred by Dr. Flores, to clinic today for 2 week follow-up after CanScript biopsy. She recently went to [facility] for second opinion, and was treated there with one dose of IV chemo (unknown type) and four days of Xeloda.  She has been off therapy completely for a few weeks.  Today, she notes 10/10 pain in the R axilla and arm related to her metastases there.      Pertinent social history - single mother with 4 children and additional grandkids, one child has been in senior living recently, one daughter has children that patient cares for and is often responsible for, one child recently graduated college after playing college football. Her mother lives nearby and she has a boyfriend that is very supportive. 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 No tobacco, alcohol, drugs.      Oncologic History (From Chart):  DIAGNOSIS:   Stage IIIC (cT2 cN3c cM0) IDC of right breast. Triple negative. BRCA neg. Dx 6/2016   Recurrence R axilla 4/2018     HISTORY OF PRESENT ILNESS:   43yo female here for diagnosis breast cancer 6/24/16, initial consult 7/19/16.   6/13/16 - MMG 3.8cm mass in middle of R breast. U/S Irreg 3.3cm mass. Numerous R axilla LN, largest 1.9cm.   6/22/16 - right breast core biopsy - IDC, grade 3.ER 0%, RI 0%, HER2-aries neg.  7/13/16 - U/S guided core bx axilla -   7/19/16 - initial med onc consult  7/25/16 shows 3.8cm mass in the breast, 2cm LN in the axilla, and 1.3cm ipsilateral supraclav LN FDG-avid.  8/2016 - chemotherapy -  completed AC with minimal clinical response   9/14/16 - U/S - MASS UNCHANGED, no evidence of response, no progression. LAD slight smaller per U/S.   9/2016 - chemo - started taxol with carboplatin added. Completed 11th weekly taxol, 12th held due to persistent SE.   1/2017 - Mastectomy and axillary dissection. ypT2 ypN1a with residual 3 LN  "positive for macromets and clinical N3   supraclav node at time of diagnosis, this node not palpated at surgery.  3/2017 - 5/2017 - Adjuvant radiation completed with multiple breaks   8/2017: PETCT shows evidence of recurrence in right axilla.  Multiple hypermetabolic right axillary lymph nodes including a suspected right prepectoral lymph node, consistent with metastatic disease.  9/20/17 - U/S guided excisional biopsy, LN shows fat necrosis and no residual tumor.   12/12/17 - She has had reconstruction and has pain at right breast and swelling. She otherwise had no other complaints.   2/27/18 - CTA chest during ED visit - irreg 4cm mass-like area of mixed density suspicious for LAD in R axilla.   3/5/18 - CT head without con - POOJA.   3/6/2018 - patient presents after multiple ED visits for pain to right axilla, she has not been able to schedule follow-up with her surgeon to assess, been placed on abx with no improvement and pain meds with minimal control. No other new symptoms.  Still working, has to for insurance and financial support, pain extremely limiting and severe. She can move arm but painful. No numbness.      3/12/2018- US soft tissue axilla- Irregular heterogeneous hypoechoic masslike region within the right axilla deep to the patient's operative scar.   3/13/2018- she has seen surgeon for second opinion and he recommended return to her primary surgeon who can hopefully see her soon. She continues with severe pain and she feels "heat" to the area of axilla, now with dec ROM. Pain wakes her at night. Not using right arm as much. No fever.      We did order PET/CT 3/2018 but she cannot afford this.   4/3/18 - simple excision right axillary mass - 2cm mass shows grade 3 IDC of breast, large central area of necrosis. Tumor present at margin. ER is negative, MO is negative, HER2 is zero, negative, by IHC.     4/9/18 med onc follow-up - still with pain but mild better. Tearful and upset during visit today. " Social issues and new diagnosis of recurrence discussed today. She is taking pain meds with some relief. No new symptoms otherwise.   4/13/18 - CT brain, chest, ab, pelvis - POOJA except known axillary abnormalities.   4/20/18 - surgery eval - not resectable  4/26/18  - discussed if not resectable, cannot treat with intention to cure. She is very upset about this.     Review of Systems    Objective:      Physical Exam      LABS:  WBC   Date Value Ref Range Status   06/25/2018 9.87 3.90 - 12.70 K/uL Final     Hemoglobin   Date Value Ref Range Status   06/25/2018 10.9 (L) 12.0 - 16.0 g/dL Final     Hematocrit   Date Value Ref Range Status   06/25/2018 35.2 (L) 37.0 - 48.5 % Final     Platelets   Date Value Ref Range Status   06/25/2018 444 (H) 150 - 350 K/uL Final       Chemistry        Component Value Date/Time     06/25/2018 0143    K 3.7 06/25/2018 0143    CL 99 06/25/2018 0143    CO2 27 06/25/2018 0143    BUN 8 06/25/2018 0143    CREATININE 0.7 06/25/2018 0143     (H) 06/25/2018 0143    GLU 97 12/28/2016 1003        Component Value Date/Time    CALCIUM 10.1 06/25/2018 0143    ALKPHOS 46 (L) 06/25/2018 0143    AST 38 06/25/2018 0143    ALT 16 06/25/2018 0143    BILITOT 0.7 06/25/2018 0143    ESTGFRAFRICA >60.0 06/25/2018 0143    EGFRNONAA >60.0 06/25/2018 0143          Assessment:       1. Malignant neoplasm of upper-outer quadrant of right breast in female, estrogen receptor negative    2. Lymphadenopathy, axillary        Plan:         1.  Metastatic breast cancer:    Long discussion with the patient her family about current options here for clinical trials.  She has recently been seen by [facility], as noted above, and received 1 dose of IV chemotherapy there along with 4 days of oral Xeloda.  She discontinued the Xeloda after this.  She has been off therapy for a couple weeks now.  She is no longer eligible for the Calithera breast cancer trial specifically for triple negative  breast cancer due to the fact that she received therapy in [place].  We will moved ahead with the move fresh biopsy of the right axilla and obtained tumor sensitivity testing via the CanScript trial and still awating results of next generation sequencing on the Strata trial.      Unfortunately, the results of tumor sensitivity testing, were only technically able to be done on 3 lines of therapy, none of which showed any response to treatment whatsoever.  These were Xeloda, Eribulin, and Vinorelbine, so we would certainly not recommend using any of these three.  I talked with some of my colleagues here and we recommend moving forward ASAP with a taxane, like Abraxane or Taxotere, given that she did have a response to taxol in the past.   Other options would be ixabepilone, olaparib, or CMF.  She wants to get chemo closer to home with Dr. Flores, which makes sense.  I told her we will keep her on our lists and keep screening her for studies as they come through.   I educated her on constipation and recommended adding Miralax BID to her current regimen.     2.  Pain:    - the patient was in significant pain due to swelling in her right upper extremity secondary to progressive disease, and perhaps a recently diagnosed DVT.   - con't current management, may double up on po diladudid to 4mg PRN if needed.   - also referred to pain management, as well as wound care and lymphedema Clinic.  Pain management scheduled later this month.   - I gave her 1mg of SQ dialudid in the office to help ease the drive back home.         3.  Psychosocial:  - refer to Heme-Onc psychology.    4- continue current anticoagulation.    Return to clinic PRN.  Will let Dr. Flores know if Sav NGS shows any actionable mutations.     The patient agrees with the plan, and all questions have been answered to their satisfaction.      More than 40 mins were spent during this encounter, greater than 50% was spent in direct counseling and/or  coordination of care.     Colton Rivers M.D., M.S., F.A.C.P.  Hematology and Oncology Attending  [facility]

## 2018-07-03 NOTE — PHYSICIAN QUERY
PT Name: Francisca Longoria  MR #: [id number]     Physician Query Form - Documentation Clarification      CDS/: Tonie Webster               Contact information: [email or web address]    This form is a permanent document in the medical record.     Query Date: July 3, 2018    By submitting this query, we are merely seeking further clarification of documentation. Please utilize your independent clinical judgment when addressing the question(s) below.    The Medical record reflects the following:    Supporting Clinical Findings Location in Medical Record   Incisional biopsy of right axillary breast carcinoma and mass.     Operative Note                                                                                      Doctor, Can you report the depth of the mass that was excised from below?    Provider Use Only    ___x_ Skin & Subcutaneous Tissue  ____ Soft Tissue  __x__ Other, please specify __potentially Lymph node that was ulcerating through the skin with tumor_______________  ____ Undetermined                                                                                                                           [  ] Clinically undetermined

## 2018-07-05 NOTE — PROGRESS NOTES
Date of Service:7/5/2018     DIAGNOSIS:   Francisca Longoria is a 43 year old woman  with a history of stage IIIc (cT2 cN3c cM0), grade 3, triple negitive R breast cancer, initally diagnosed in June 2016, with a R axillary recurrence in April 2018.     TREATMENT:   1/25/17 mastectomy and axillary dissection with Dr. Josh Alvarado  Chemotherapy including AC with minimal response, taxol, and carboplatin, as well as radiotherapy completed in 2017     HISTORY OF PRESENT ILLNESS:   Francisca Longoria is a 43 year old woman comes in for followup of a R axillary excisional biopsy, sent for CanScript chemotherapy targeting study.  She reciently went to [facility] for second opinion, and was treated there with one dose of an (unknown) IV Chemothereputic, and four days of Xeloda,  She has been completely off therapy for the past several weeks, and is scheduled to resume treatment this Friday.     Following the biopsy, the incision site remains open and has a similar foul odor as prior to the operation.       Ms. Longoria reports that she is visited several times a week by at home wound care, though she notes that she had not changed the wound packing since the last visit on Monday.          Ms. Longoria indicated that she plans to visit her pain specialist to address ongoing severe pain.  She was advised to begin daily use of a laxative, in addition to her current stool softener, in order to decrease constipation and associated cramping.        Plan    Ms. Longoria will continue receiving chemo with the objective of decreasing tumor bulk and clearing lymphatic drainage of the R extremity.      Daily to twice-daily changes of wound packing with sterile-saline soaked gauze, to be removed prior to daily showering.  I have personally taken the history and examined this patient and agree with the resident's note as stated above.  Pt has also seen Dr. Hawkins in [place].  Incisional biopsy appears to have yielded an appropriate amount of viable  tissue for study.  Continue local wound care to R axilla.  F/U with me prn at this point.

## 2018-07-05 NOTE — MEDICAL/APP STUDENT
Date of Service:7/5/2018    DIAGNOSIS:   Francisca Longoria is a 43 year old woman  with a history of stage IIIc (cT2 cN3c cM0), grade 3, triple negitive R breast cancer, initally diagnosed in June 2016, with a R axillary recurrence in April 2018.    TREATMENT:   1/25/17 mastectomy and axillary dissection with Dr. Josh Alvarado  Chemotherapy including AC with minimal response, taxol, and carboplatin, as well as radiotherapy completed in 2017    HISTORY OF PRESENT ILLNESS:   Francisca Longoria is a 43 year old woman comes in for followup of a R axillary excisional biopsy, sent for CanScript chemotherapy targeting study.  She reciently went to [facility] for second opinion, and was treated there with one dose of an (unknown) IV Chemothereputic, and four days of Xeloda,  She has been completely off therapy for the past several weeks, and is scheduled to resume treatment this Friday.    Following the biopsy, the incision site remains open and has a similar foul odor as prior to the operation.      Ms. Longoria reports that she is visited several times a week by at home wound care, though she notes that she had not changed the wound packing since the last visit on Monday.         Ms. Longoria indicated that she plans to visit her pain specialist to address ongoing severe pain.  She was advised to begin daily use of a laxative, in addition to her current stool softener, in order to decrease constipation and associated cramping.      Plan    Ms. Longoria will continue receiving chemo with the objective of decreasing tumor bulk and clearing lymphatic drainage of the R extremity.     Daily to twice-daily changes of wound packing with sterile-saline soaked gauze, to be removed prior to daily showering.

## 2018-07-06 NOTE — PROGRESS NOTES
PSYCHO-ONCOLOGY NOTE/ Individual Psychotherapy     Date: 7/6/2018   Site:  [facility]        Therapeutic Intervention: Met with patient, partner and cousin.  Outpatient - Behavior modifying psychotherapy 30 min - CPT code 81467    The patient's last visit with me was on 6/18/2018.    Problem list  Patient Active Problem List   Diagnosis    Bartholin's gland abscess    Abnormal uterine bleeding    Malignant neoplasm of upper-outer quadrant of right breast in female, estrogen receptor negative    Non morbid obesity    Neuropathy due to chemotherapeutic drug    Neuropathic pain of both legs    Local recurrence of malignant tumor of right breast    Cancer associated pain    Acute deep vein thrombosis (DVT) of right upper extremity    Anxiety    Chest pain    Constipation    Open wound of right female breast    Lymphadenopathy, axillary    Adjustment disorder with mixed anxiety and depressed mood    Axillary mass, right       Chief complaint/reason for encounter: depression, anxiety and sleep   Met with patient to evaluate psychosocial adaptation to diagnosis/treatment of breast cancer    Current Medications  Current Outpatient Prescriptions   Medication    ALPRAZolam (XANAX) 1 MG tablet    ALPRAZolam (XANAX) 1 MG tablet    apixaban (ELIQUIS) 5 mg Tab    docusate sodium (COLACE) 100 MG capsule    HYDROmorphone (DILAUDID) 2 MG tablet    lactulose (CHRONULAC) 10 gram/15 mL solution    metoclopramide HCl (REGLAN) 10 MG tablet    morphine (MS CONTIN) 30 MG 12 hr tablet    ondansetron (ZOFRAN-ODT) 4 MG TbDL    oxyCODONE (ROXICODONE) 10 mg Tab immediate release tablet    senna-docusate 8.6-50 mg (SENNA-S) 8.6-50 mg per tablet     No current facility-administered medications for this visit.        Objective:  Francisca Longoria arrived 30 minutes late for the session accompanied by her mother, cousin, and partner.  Ms. Longoria was using a wheelchair for mobility at the time of session. The patient was  "minimally interactive during the session due to visible pain.  Appearance: age appropriate,  appropriately dressed, obvious foul odor from wound  Behavior/Cooperation: cooperative, minimally interactive  Speech: low volume, monosyllabic   Mood: depressed  Affect:constricted, visible pain  Thought Process: goal-directed, logical  Thought Content: normal,  No delusions or paranoia; did not appear to be responding to internal stimuli during the session  Orientation: grossly intact  Memory: Grossly intact  Attention Span/Concentration: Attends to session with difficulty  Fund of Knowledge: average  Estimate of Intelligence: average from verbal skills and history  Cognition: grossly intact  Insight: patient has awareness of illness; good insight into own behavior and behavior of others  Judgment: the patient's behavior is adequate to circumstances    Interval history and content of current session: Patient and family discussed ongoing struggles with wound care and pain management.  Mother is especially frustrated by perceived lack of urgency on "getting the wound cleaned out." Fiancee is exhausted and openly expresses need for assistance with patient's care.  Reports that patient is not sleeping at night due to pain, keeping him up, as well.  Discussed options for increased care assistance (palliative care, hospice). Discussed differences between palliative care and hospice. Encouraged family to discuss their options.  Patient is not ready to consider hospice care, but is open to the concept of palliative care.      Identified and evaluated psychosocial and environmental stressors secondary to diagnosis and treatment.  Examined proactive behaviors that may be implemented to minimize or ameliorate psychosocial stressors secondary to diagnosis and treatment.    Patient does not appear to be taking the Cymbalta prescribed by Dr. Flores.  She does report a willingness to take it and family prefers it be called in to " [facility] to facilitate adherence.  Family reports they are using a medication notebook.  Encouraged family to bring notebook to every visit with medical providers and to include information about sleep and bowel movements to facilitate adjustment of medications going forward.      Risk parameters:   Patient reports no homicidal ideation  Patient reports no self-injurious behavior  Patient reports no violent behavior   Patient reports mild suicidal ideation without intent or plan   Safety needs:  None at this time      Verbal deficits: None     Patient's response to intervention:The patient's response to intervention is accepting.     Progress toward goals and other mental status changes:  The patient's progress toward goals is limited.      Progress to date:No Progress - Continue Objectives      Goals from last visit: not met      Patient reported outcomes:  Distress Thermometer:   Distress Score    Distress Score: 10        Practical Problems Physical Problems                                                   Family Problems                                         Emotional Problems                                                         Spiritual/Religions Concerns               Other Problems            Not completed on this date   PHQ-9= 11 at intake   LIU-7= 11 at intake      Client Strengths: good social support, strong chet, strong cultural traditions    Diagnosis:     ICD-10-CM ICD-9-CM   1. Adjustment disorder with mixed anxiety and depressed mood F43.23 309.28       Treatment Plan:individual psychotherapy and medication management by physician  · Target symptoms: depression, anxiety , adjustment  · Why chosen therapy is appropriate versus another modality: relevant to diagnosis, evidence based practice  · Outcome monitoring methods: self-report, feedback from family, checklist/rating scale  · Therapeutic intervention type: behavior modifying psychotherapy  · Prognosis: Guarded      Behavioral goals:     Exercise:  Out of chair/bed at least 3x daily   Stress management:   Social engagement:   Nutrition:   Smoking Cessation:   Therapy:  Start on Cymbalta as per Dr. Flores, keep track of ALL medications/BMs/Sleep in notebook and bring to ALL visits    Discuss palliative care options with Dr. Flores and/or palliative care provider (prefers in home palliative care- unclear if [facility] goes to [facility])    Return to clinic: as needed     Length of Service (minutes direct face-to-face contact): 30    Bautista Escobedo, PhD  [place] License #[id number]

## 2018-07-11 NOTE — PROGRESS NOTES
"Protocol: [employer]/ "an observational study profiling biospecimens from cancer patients to screen for molecular alterations"  Protocol # [id number]  Amendment 3 Date approved May 1, 2018  PI: Dr. Colton Rivers  Pt: Francisca Longoria         Consent waiver and Eligibility Note      Patient meets eligibility this week for study based on the following criteria. Consent waiver in place.       4.1.1 Patient is ? 16 years of age. YOB: 1975, and she is 43 years old.     4.1.2 Patient has histologically documented invasive ductal carcinoma per path report on 4/3/2018.      4.1.3.4. Patient has Stage III metastatic breast cancer per clinic note on 7/2/2018.     4.1.4 Pending receipt of adequate tissue from Pathology.     All other eligibility criteria does not apply to this patient.   Specimen requested.  "

## 2018-07-20 PROBLEM — E86.0 DEHYDRATION: Status: ACTIVE | Noted: 2018-01-01

## 2018-07-20 PROBLEM — G89.3 CANCER RELATED PAIN: Status: ACTIVE | Noted: 2018-01-01

## 2018-07-25 NOTE — TELEPHONE ENCOUNTER
Hello, this is staff I've received your request I'm returning your call from the back and spine clinic at [facility]. I just wanted to let you know that I'm working on getting an answer for you by today.     Staff tried to contact patient to reschedule her appointment that she missed on 7/24/18 with Dr. Brown no answer, left message for patient to call back and reschedule.

## 2020-03-20 NOTE — TRANSFER OF CARE
"Anesthesia Transfer of Care Note    Patient: Francisca Lonogria    Procedure(s) Performed: Procedure(s) (LRB):  INCISIONAL BIOPSY (Right)    Patient location: PACU    Anesthesia Type: general    Transport from OR: Transported from OR on 6-10 L/min O2 by face mask with adequate spontaneous ventilation    Post pain: adequate analgesia    Post assessment: no apparent anesthetic complications and tolerated procedure well    Post vital signs: stable    Level of consciousness: awake    Nausea/Vomiting: no nausea/vomiting    Complications: none    Transfer of care protocol was followed      Last vitals:   Visit Vitals  /70 (BP Location: Left arm, Patient Position: Sitting)   Pulse (!) 113   Temp 36.8 °C (98.2 °F)   Resp 16   Ht 5' 5" (1.651 m)   Wt 90.3 kg (199 lb)   LMP 01/01/2016 (Approximate)   SpO2 99%   Breastfeeding? No   BMI 33.12 kg/m²     " None

## (undated) DEVICE — ELECTRODE BLADE INSULATED 1 IN

## (undated) DEVICE — SEE MEDLINE ITEM 157117

## (undated) DEVICE — SUT 2-0 VICRYL / SH (J417)

## (undated) DEVICE — GAUZE SPONGE 4'X4 12 PLY

## (undated) DEVICE — GOWN SURGICAL X-LARGE

## (undated) DEVICE — SEE MEDLINE ITEM 157216

## (undated) DEVICE — HEMOSTAT SURGICEL FIBRLR 1X2IN

## (undated) DEVICE — ELECTRODE REM PLYHSV RETURN 9

## (undated) DEVICE — APPLICATOR CHLORAPREP ORN 26ML

## (undated) DEVICE — SPONGE DERMACEA GAUZE 4X4

## (undated) DEVICE — SUT CTD VICRYL CR/RB-1 4-0

## (undated) DEVICE — TRAY MINOR GEN SURG

## (undated) DEVICE — SEE MEDLINE ITEM 152622

## (undated) DEVICE — GAUZE SPONGE 4X4 12PLY

## (undated) DEVICE — SUT 2/0 30IN SILK BLK BRAI